# Patient Record
Sex: FEMALE | Race: WHITE | NOT HISPANIC OR LATINO | Employment: UNEMPLOYED | URBAN - NONMETROPOLITAN AREA
[De-identification: names, ages, dates, MRNs, and addresses within clinical notes are randomized per-mention and may not be internally consistent; named-entity substitution may affect disease eponyms.]

---

## 2017-08-21 ENCOUNTER — HOSPITAL ENCOUNTER (EMERGENCY)
Facility: HOSPITAL | Age: 27
Discharge: HOME | End: 2017-08-21
Attending: EMERGENCY MEDICINE | Admitting: EMERGENCY MEDICINE

## 2017-08-21 ENCOUNTER — APPOINTMENT (OUTPATIENT)
Dept: GENERAL RADIOLOGY | Facility: HOSPITAL | Age: 27
End: 2017-08-21

## 2017-08-21 VITALS
SYSTOLIC BLOOD PRESSURE: 115 MMHG | HEART RATE: 98 BPM | HEIGHT: 66 IN | OXYGEN SATURATION: 98 % | BODY MASS INDEX: 34.55 KG/M2 | DIASTOLIC BLOOD PRESSURE: 78 MMHG | WEIGHT: 215 LBS | TEMPERATURE: 98.7 F | RESPIRATION RATE: 18 BRPM

## 2017-08-21 DIAGNOSIS — L02.511 ABSCESS OF RIGHT HAND: Primary | ICD-10-CM

## 2017-08-21 DIAGNOSIS — L03.113 CELLULITIS OF RIGHT HAND: ICD-10-CM

## 2017-08-21 DIAGNOSIS — L02.611 ABSCESS OF RIGHT FOOT: ICD-10-CM

## 2017-08-21 DIAGNOSIS — L03.115 CELLULITIS OF RIGHT ANKLE: ICD-10-CM

## 2017-08-21 LAB
6-ACETYL MORPHINE: NEGATIVE
ALBUMIN SERPL-MCNC: 3.8 G/DL (ref 3.5–5)
ALBUMIN/GLOB SERPL: 1.3 G/DL (ref 1.5–2.5)
ALP SERPL-CCNC: 89 U/L (ref 35–104)
ALT SERPL W P-5'-P-CCNC: 22 U/L (ref 10–36)
AMPHET+METHAMPHET UR QL: NEGATIVE
ANION GAP SERPL CALCULATED.3IONS-SCNC: 5.1 MMOL/L (ref 3.6–11.2)
AST SERPL-CCNC: 18 U/L (ref 10–30)
B-HCG UR QL: NEGATIVE
BARBITURATES UR QL SCN: POSITIVE
BASOPHILS # BLD AUTO: 0.02 10*3/MM3 (ref 0–0.3)
BASOPHILS NFR BLD AUTO: 0.2 % (ref 0–2)
BENZODIAZ UR QL SCN: NEGATIVE
BILIRUB SERPL-MCNC: 0.3 MG/DL (ref 0.2–1.8)
BILIRUB UR QL STRIP: NEGATIVE
BUN BLD-MCNC: 6 MG/DL (ref 7–21)
BUN/CREAT SERPL: 15 (ref 7–25)
BUPRENORPHINE SERPL-MCNC: POSITIVE NG/ML
CALCIUM SPEC-SCNC: 8.8 MG/DL (ref 7.7–10)
CANNABINOIDS SERPL QL: POSITIVE
CHLORIDE SERPL-SCNC: 105 MMOL/L (ref 99–112)
CLARITY UR: ABNORMAL
CO2 SERPL-SCNC: 24.9 MMOL/L (ref 24.3–31.9)
COCAINE UR QL: NEGATIVE
COLOR UR: YELLOW
CREAT BLD-MCNC: 0.4 MG/DL (ref 0.43–1.29)
CRP SERPL-MCNC: 18.32 MG/DL (ref 0–0.99)
D-LACTATE SERPL-SCNC: 0.5 MMOL/L (ref 0.5–2)
DEPRECATED RDW RBC AUTO: 43.8 FL (ref 37–54)
EOSINOPHIL # BLD AUTO: 0.13 10*3/MM3 (ref 0–0.7)
EOSINOPHIL NFR BLD AUTO: 1.3 % (ref 0–5)
ERYTHROCYTE [DISTWIDTH] IN BLOOD BY AUTOMATED COUNT: 14.2 % (ref 11.5–14.5)
ERYTHROCYTE [SEDIMENTATION RATE] IN BLOOD: 25 MM/HR (ref 0–20)
GFR SERPL CREATININE-BSD FRML MDRD: >150 ML/MIN/1.73
GLOBULIN UR ELPH-MCNC: 2.9 GM/DL
GLUCOSE BLD-MCNC: 81 MG/DL (ref 70–110)
GLUCOSE UR STRIP-MCNC: NEGATIVE MG/DL
HCT VFR BLD AUTO: 39 % (ref 37–47)
HGB BLD-MCNC: 13.1 G/DL (ref 12–16)
HGB UR QL STRIP.AUTO: NEGATIVE
IMM GRANULOCYTES # BLD: 0.02 10*3/MM3 (ref 0–0.03)
IMM GRANULOCYTES NFR BLD: 0.2 % (ref 0–0.5)
KETONES UR QL STRIP: NEGATIVE
LEUKOCYTE ESTERASE UR QL STRIP.AUTO: NEGATIVE
LYMPHOCYTES # BLD AUTO: 2.02 10*3/MM3 (ref 1–3)
LYMPHOCYTES NFR BLD AUTO: 20.4 % (ref 21–51)
MCH RBC QN AUTO: 28.9 PG (ref 27–33)
MCHC RBC AUTO-ENTMCNC: 33.6 G/DL (ref 33–37)
MCV RBC AUTO: 85.9 FL (ref 80–94)
METHADONE UR QL SCN: NEGATIVE
MONOCYTES # BLD AUTO: 1.02 10*3/MM3 (ref 0.1–0.9)
MONOCYTES NFR BLD AUTO: 10.3 % (ref 0–10)
NEUTROPHILS # BLD AUTO: 6.69 10*3/MM3 (ref 1.4–6.5)
NEUTROPHILS NFR BLD AUTO: 67.6 % (ref 30–70)
NITRITE UR QL STRIP: NEGATIVE
OPIATES UR QL: NEGATIVE
OSMOLALITY SERPL CALC.SUM OF ELEC: 266.7 MOSM/KG (ref 273–305)
OXYCODONE UR QL SCN: NEGATIVE
PCP UR QL SCN: NEGATIVE
PH UR STRIP.AUTO: 6.5 [PH] (ref 5–8)
PLATELET # BLD AUTO: 228 10*3/MM3 (ref 130–400)
PMV BLD AUTO: 11.6 FL (ref 6–10)
POTASSIUM BLD-SCNC: 3.3 MMOL/L (ref 3.5–5.3)
PROT SERPL-MCNC: 6.7 G/DL (ref 6–8)
PROT UR QL STRIP: NEGATIVE
RBC # BLD AUTO: 4.54 10*6/MM3 (ref 4.2–5.4)
SODIUM BLD-SCNC: 135 MMOL/L (ref 135–153)
SP GR UR STRIP: 1.01 (ref 1–1.03)
UROBILINOGEN UR QL STRIP: ABNORMAL
WBC NRBC COR # BLD: 9.9 10*3/MM3 (ref 4.5–12.5)

## 2017-08-21 PROCEDURE — 81003 URINALYSIS AUTO W/O SCOPE: CPT | Performed by: PHYSICIAN ASSISTANT

## 2017-08-21 PROCEDURE — 87040 BLOOD CULTURE FOR BACTERIA: CPT | Performed by: PHYSICIAN ASSISTANT

## 2017-08-21 PROCEDURE — 80307 DRUG TEST PRSMV CHEM ANLYZR: CPT | Performed by: PHYSICIAN ASSISTANT

## 2017-08-21 PROCEDURE — 73610 X-RAY EXAM OF ANKLE: CPT

## 2017-08-21 PROCEDURE — 73130 X-RAY EXAM OF HAND: CPT | Performed by: RADIOLOGY

## 2017-08-21 PROCEDURE — 80053 COMPREHEN METABOLIC PANEL: CPT | Performed by: PHYSICIAN ASSISTANT

## 2017-08-21 PROCEDURE — 96361 HYDRATE IV INFUSION ADD-ON: CPT

## 2017-08-21 PROCEDURE — 73130 X-RAY EXAM OF HAND: CPT

## 2017-08-21 PROCEDURE — 73610 X-RAY EXAM OF ANKLE: CPT | Performed by: RADIOLOGY

## 2017-08-21 PROCEDURE — 99283 EMERGENCY DEPT VISIT LOW MDM: CPT

## 2017-08-21 PROCEDURE — 85025 COMPLETE CBC W/AUTO DIFF WBC: CPT | Performed by: PHYSICIAN ASSISTANT

## 2017-08-21 PROCEDURE — 85652 RBC SED RATE AUTOMATED: CPT | Performed by: PHYSICIAN ASSISTANT

## 2017-08-21 PROCEDURE — 83605 ASSAY OF LACTIC ACID: CPT | Performed by: PHYSICIAN ASSISTANT

## 2017-08-21 PROCEDURE — 25010000002 DALBAVANCIN 500 MG RECONSTITUTED SOLUTION 1 EACH VIAL: Performed by: PHYSICIAN ASSISTANT

## 2017-08-21 PROCEDURE — 86140 C-REACTIVE PROTEIN: CPT | Performed by: PHYSICIAN ASSISTANT

## 2017-08-21 PROCEDURE — 81025 URINE PREGNANCY TEST: CPT | Performed by: PHYSICIAN ASSISTANT

## 2017-08-21 PROCEDURE — 96365 THER/PROPH/DIAG IV INF INIT: CPT

## 2017-08-21 RX ORDER — LIDOCAINE HYDROCHLORIDE 10 MG/ML
10 INJECTION, SOLUTION EPIDURAL; INFILTRATION; INTRACAUDAL; PERINEURAL ONCE
Status: COMPLETED | OUTPATIENT
Start: 2017-08-21 | End: 2017-08-21

## 2017-08-21 RX ORDER — SODIUM CHLORIDE 0.9 % (FLUSH) 0.9 %
10 SYRINGE (ML) INJECTION AS NEEDED
Status: DISCONTINUED | OUTPATIENT
Start: 2017-08-21 | End: 2017-08-22 | Stop reason: HOSPADM

## 2017-08-21 RX ORDER — BUPRENORPHINE HYDROCHLORIDE AND NALOXONE HYDROCHLORIDE DIHYDRATE 8; 2 MG/1; MG/1
1 TABLET SUBLINGUAL DAILY
Status: ON HOLD | COMMUNITY
End: 2017-11-06

## 2017-08-21 RX ADMIN — SODIUM CHLORIDE 1000 ML: 9 INJECTION, SOLUTION INTRAVENOUS at 20:38

## 2017-08-21 RX ADMIN — LIDOCAINE HYDROCHLORIDE 10 ML: 10 INJECTION, SOLUTION EPIDURAL; INFILTRATION; INTRACAUDAL; PERINEURAL at 21:27

## 2017-08-21 RX ADMIN — DALBAVANCIN 1500 MG: 500 INJECTION, POWDER, FOR SOLUTION INTRAVENOUS at 22:22

## 2017-08-22 NOTE — ED PROVIDER NOTES
Subjective   HPI Comments: 27 year old female who presents to the ED with an abscess to her right hand and right ankle.  She states she noticed them yesterday.  She states the areas have been draining.  She states she has had a fever of 101.  She took Ibuprofen at 11 am today and Tylenol at 4 pm today.  She denies any nausea or vomiting.  She states she did take 2 leftover Amoxicillin yesterday.  She denies any IV drug use.  She states she thinks this all started due to an abscess tooth that started 3 days ago.    Patient is a 27 y.o. female presenting with abscess.   History provided by:  Patient  Abscess   Location:  Hand and leg  Hand abscess location:  Dorsum of R hand  Leg abscess location:  R ankle  Size:  2 cm on hand, 3 cm on ankle  Abscess quality: draining, fluctuance, painful, redness and warmth    Duration:  1 day  Progression:  Worsening  Pain details:     Quality:  Throbbing    Severity:  Moderate    Timing:  Constant    Progression:  Worsening  Chronicity:  New  Context: not injected drug use    Relieved by:  Nothing  Worsened by:  Nothing  Associated symptoms: fever    Associated symptoms: no nausea and no vomiting        Review of Systems   Constitutional: Positive for chills and fever.   HENT: Negative.    Eyes: Negative.    Respiratory: Negative.    Cardiovascular: Negative.    Gastrointestinal: Negative for nausea and vomiting.   Genitourinary: Negative.    Musculoskeletal: Negative.    Skin: Positive for wound.   Neurological: Negative.    Psychiatric/Behavioral: Negative.    All other systems reviewed and are negative.      Past Medical History:   Diagnosis Date   • Bipolar disorder    • Depression    • History of nightmares    • Liver disease     Unknown B or C    • Meningitis spinal     Age 12   • Panic disorder    • PTSD (post-traumatic stress disorder)     Visual Disturbance    • Substance abuse     Drug       No Known Allergies    Past Surgical History:   Procedure Laterality Date   • FOOT  SURGERY Left        Family History   Problem Relation Age of Onset   • Depression Mother    • ADD / ADHD Sister    • Alcohol abuse Sister    • Depression Sister    • Drug abuse Sister    • Asperger's syndrome Brother    • No Known Problems Maternal Aunt    • No Known Problems Paternal Aunt    • No Known Problems Maternal Uncle    • No Known Problems Paternal Uncle    • No Known Problems Maternal Grandfather    • No Known Problems Maternal Grandmother    • No Known Problems Paternal Grandfather    • No Known Problems Paternal Grandmother    • No Known Problems Cousin    • Depression Other        Social History     Social History   • Marital status: Single     Spouse name: N/A   • Number of children: N/A   • Years of education: N/A     Social History Main Topics   • Smoking status: Current Every Day Smoker     Packs/day: 1.00     Years: 10.00   • Smokeless tobacco: Never Used      Comment: Pt. declines counseling at this time.    • Alcohol use No   • Drug use: Yes     Special: Other, Marijuana      Comment: Suboxone, Opiate   • Sexual activity: Defer     Other Topics Concern   • None     Social History Narrative           Objective   Physical Exam   Constitutional: She is oriented to person, place, and time. She appears well-developed and well-nourished. No distress.   HENT:   Head: Normocephalic and atraumatic.   Right Ear: External ear normal.   Left Ear: External ear normal.   Nose: Nose normal.   Mouth/Throat: Oropharynx is clear and moist.   Pt has a fractured right lower second molar, no gum swelling seen   Eyes: Conjunctivae and EOM are normal. Pupils are equal, round, and reactive to light.   Neck: Normal range of motion. Neck supple.   Cardiovascular: Normal rate, regular rhythm, normal heart sounds and intact distal pulses.    Pulmonary/Chest: Effort normal and breath sounds normal. No respiratory distress.   Abdominal: Soft. Bowel sounds are normal. There is no tenderness.   Musculoskeletal: Normal range of  motion.   Neurological: She is alert and oriented to person, place, and time.   Skin: Skin is warm and dry. There is erythema.   Patient has a 2 cm abscess over the dorsum of the right 1st metacarpal with redness and swelling over the entire dorsum of the right hand extending into the right wrist.  She has full ROM of her wrist and thumb.  Mild drainage seen.  Patient has a 3 cm abscess on the medial side of the right ankle with redness and swelling that extends into the right foot and a third of the way up the right anterior lower leg.  Mild drainage seen.   Psychiatric: She has a normal mood and affect. Her behavior is normal. Judgment and thought content normal.   Nursing note and vitals reviewed.      Incision and drainage  Date/Time: 8/21/2017 9:39 PM  Performed by: GIANNI MARCIAL  Authorized by: DAWN BULL     Consent:     Consent obtained:  Written    Consent given by:  Patient    Risks discussed:  Bleeding, incomplete drainage, pain and infection  Location:     Type:  Abscess    Size:  3 cm    Location:  Lower extremity    Lower extremity location:  R ankle  Pre-procedure details:     Skin preparation:  Chloraprep  Anesthesia (see MAR for exact dosages):     Anesthesia method:  Local infiltration    Local anesthetic:  Lidocaine 1% w/o epi  Procedure details:     Complexity:  Simple    Incision types:  Single straight    Incision depth:  Dermal    Scalpel blade:  11    Wound management:  Probed and deloculated    Drainage:  Purulent    Drainage amount:  Moderate    Wound treatment:  Wound left open  Post-procedure details:     Patient tolerance of procedure:  Tolerated well, no immediate complications  Incision and drainage  Date/Time: 8/21/2017 9:40 PM  Performed by: GIANNI MARCIAL  Authorized by: DAWN BULL     Consent:     Consent obtained:  Written    Consent given by:  Patient    Risks discussed:  Bleeding, incomplete drainage, pain and damage to other organs  Location:     Type:  Abscess     Size:  2 cm    Location:  Upper extremity    Upper extremity location:  R hand  Pre-procedure details:     Skin preparation:  Chloraprep  Anesthesia (see MAR for exact dosages):     Anesthesia method:  Local infiltration    Local anesthetic:  Lidocaine 1% w/o epi  Procedure details:     Complexity:  Simple    Incision types:  Single straight    Incision depth:  Dermal    Scalpel blade:  11    Wound management:  Probed and deloculated    Drainage:  Purulent    Drainage amount:  Moderate    Wound treatment:  Wound left open  Post-procedure details:     Patient tolerance of procedure:  Tolerated well, no immediate complications             ED Course  ED Course   Comment By Time   Dr. Ferro reviewed x-rays, no acute findings.  I have performed an I&D on both abscesses, patient tolerated well.  Will give a dose of Dalvance and then will discharge home to follow up outpatient. CULLEN Meyers 08/21 2141                  [facility]  Number of Diagnoses or Management Options  Abscess of right foot:   Abscess of right hand:   Cellulitis of right ankle:   Cellulitis of right hand:      Amount and/or Complexity of Data Reviewed  Clinical lab tests: reviewed  Tests in the radiology section of CPT®: reviewed    Patient Progress  Patient progress: stable      Final diagnoses:   Abscess of right hand   Abscess of right foot   Cellulitis of right hand   Cellulitis of right ankle            CULLEN Meyers  08/21/17 [id number]

## 2017-08-22 NOTE — ED NOTES
Pt in the middle of an I&D procedure. Will obtain 2nd set of blood cultures when procedure is done.      Amaya Sainz  08/21/17 2534

## 2017-08-22 NOTE — DISCHARGE INSTRUCTIONS

## 2017-08-22 NOTE — ED NOTES
Unable to obtain blood for blood culture via 2 failed attempts. Called lab to obtain blood. Spoke with Radha in lab who said she would come and try to get the blood cultures.     Amaya Sainz  08/21/17 [id number]

## 2017-08-26 LAB — BACTERIA SPEC AEROBE CULT: NORMAL

## 2017-08-27 LAB — BACTERIA SPEC AEROBE CULT: NORMAL

## 2017-11-06 ENCOUNTER — HOSPITAL ENCOUNTER (EMERGENCY)
Facility: HOSPITAL | Age: 27
Discharge: STILL ADMITTED OR ADMINISTRATIVE | End: 2017-11-06
Attending: EMERGENCY MEDICINE

## 2017-11-06 ENCOUNTER — HOSPITAL ENCOUNTER (INPATIENT)
Facility: HOSPITAL | Age: 27
LOS: 4 days | Discharge: HOME | End: 2017-11-10
Attending: PSYCHIATRY & NEUROLOGY | Admitting: PSYCHIATRY & NEUROLOGY

## 2017-11-06 VITALS
SYSTOLIC BLOOD PRESSURE: 117 MMHG | HEIGHT: 67 IN | TEMPERATURE: 97.7 F | WEIGHT: 280 LBS | BODY MASS INDEX: 43.95 KG/M2 | DIASTOLIC BLOOD PRESSURE: 84 MMHG | HEART RATE: 86 BPM | RESPIRATION RATE: 16 BRPM | OXYGEN SATURATION: 98 %

## 2017-11-06 DIAGNOSIS — F19.10 SUBSTANCE ABUSE (HCC): ICD-10-CM

## 2017-11-06 DIAGNOSIS — F29 PSYCHOSIS, UNSPECIFIED PSYCHOSIS TYPE (HCC): Primary | ICD-10-CM

## 2017-11-06 DIAGNOSIS — F31.64 BIPOLAR DISORDER, CURRENT EPISODE MIXED, SEVERE, WITH PSYCHOTIC FEATURES (HCC): ICD-10-CM

## 2017-11-06 PROBLEM — R45.851 SUICIDAL IDEATIONS: Status: ACTIVE | Noted: 2017-11-06

## 2017-11-06 LAB
6-ACETYL MORPHINE: NEGATIVE
ALBUMIN SERPL-MCNC: 4.5 G/DL (ref 3.5–5)
ALBUMIN/GLOB SERPL: 1.3 G/DL (ref 1.5–2.5)
ALP SERPL-CCNC: 103 U/L (ref 35–104)
ALT SERPL W P-5'-P-CCNC: 29 U/L (ref 10–36)
AMPHET+METHAMPHET UR QL: POSITIVE
ANION GAP SERPL CALCULATED.3IONS-SCNC: 6.2 MMOL/L (ref 3.6–11.2)
AST SERPL-CCNC: 19 U/L (ref 10–30)
B-HCG UR QL: NEGATIVE
BARBITURATES UR QL SCN: NEGATIVE
BASOPHILS # BLD AUTO: 0.04 10*3/MM3 (ref 0–0.3)
BASOPHILS NFR BLD AUTO: 0.5 % (ref 0–2)
BENZODIAZ UR QL SCN: NEGATIVE
BILIRUB SERPL-MCNC: 0.6 MG/DL (ref 0.2–1.8)
BILIRUB UR QL STRIP: NEGATIVE
BUN BLD-MCNC: <5 MG/DL (ref 7–21)
BUN/CREAT SERPL: ABNORMAL (ref 7–25)
BUPRENORPHINE SERPL-MCNC: POSITIVE NG/ML
CALCIUM SPEC-SCNC: 9.9 MG/DL (ref 7.7–10)
CANNABINOIDS SERPL QL: POSITIVE
CHLORIDE SERPL-SCNC: 107 MMOL/L (ref 99–112)
CLARITY UR: ABNORMAL
CO2 SERPL-SCNC: 24.8 MMOL/L (ref 24.3–31.9)
COCAINE UR QL: NEGATIVE
COLOR UR: YELLOW
CREAT BLD-MCNC: 0.52 MG/DL (ref 0.43–1.29)
DEPRECATED RDW RBC AUTO: 44.9 FL (ref 37–54)
EOSINOPHIL # BLD AUTO: 0.3 10*3/MM3 (ref 0–0.7)
EOSINOPHIL NFR BLD AUTO: 4 % (ref 0–5)
ERYTHROCYTE [DISTWIDTH] IN BLOOD BY AUTOMATED COUNT: 14.1 % (ref 11.5–14.5)
ETHANOL BLD-MCNC: <10 MG/DL
ETHANOL UR QL: <0.01 %
GFR SERPL CREATININE-BSD FRML MDRD: 139 ML/MIN/1.73
GLOBULIN UR ELPH-MCNC: 3.4 GM/DL
GLUCOSE BLD-MCNC: 104 MG/DL (ref 70–110)
GLUCOSE UR STRIP-MCNC: NEGATIVE MG/DL
HCT VFR BLD AUTO: 46.1 % (ref 37–47)
HGB BLD-MCNC: 15.3 G/DL (ref 12–16)
HGB UR QL STRIP.AUTO: NEGATIVE
IMM GRANULOCYTES # BLD: 0 10*3/MM3 (ref 0–0.03)
IMM GRANULOCYTES NFR BLD: 0 % (ref 0–0.5)
KETONES UR QL STRIP: ABNORMAL
LEUKOCYTE ESTERASE UR QL STRIP.AUTO: NEGATIVE
LYMPHOCYTES # BLD AUTO: 1.97 10*3/MM3 (ref 1–3)
LYMPHOCYTES NFR BLD AUTO: 26.4 % (ref 21–51)
MAGNESIUM SERPL-MCNC: 2.3 MG/DL (ref 1.7–2.6)
MCH RBC QN AUTO: 28.9 PG (ref 27–33)
MCHC RBC AUTO-ENTMCNC: 33.2 G/DL (ref 33–37)
MCV RBC AUTO: 87.1 FL (ref 80–94)
METHADONE UR QL SCN: NEGATIVE
MONOCYTES # BLD AUTO: 0.67 10*3/MM3 (ref 0.1–0.9)
MONOCYTES NFR BLD AUTO: 9 % (ref 0–10)
NEUTROPHILS # BLD AUTO: 4.47 10*3/MM3 (ref 1.4–6.5)
NEUTROPHILS NFR BLD AUTO: 60.1 % (ref 30–70)
NITRITE UR QL STRIP: NEGATIVE
OPIATES UR QL: NEGATIVE
OSMOLALITY SERPL CALC.SUM OF ELEC: NORMAL MOSM/KG (ref 273–305)
OXYCODONE UR QL SCN: NEGATIVE
PCP UR QL SCN: NEGATIVE
PH UR STRIP.AUTO: 7.5 [PH] (ref 5–8)
PLATELET # BLD AUTO: 236 10*3/MM3 (ref 130–400)
PMV BLD AUTO: 11.6 FL (ref 6–10)
POTASSIUM BLD-SCNC: 4 MMOL/L (ref 3.5–5.3)
PROT SERPL-MCNC: 7.9 G/DL (ref 6–8)
PROT UR QL STRIP: NEGATIVE
RBC # BLD AUTO: 5.29 10*6/MM3 (ref 4.2–5.4)
SODIUM BLD-SCNC: 138 MMOL/L (ref 135–153)
SP GR UR STRIP: 1.02 (ref 1–1.03)
UROBILINOGEN UR QL STRIP: ABNORMAL
WBC NRBC COR # BLD: 7.45 10*3/MM3 (ref 4.5–12.5)

## 2017-11-06 PROCEDURE — 80307 DRUG TEST PRSMV CHEM ANLYZR: CPT | Performed by: EMERGENCY MEDICINE

## 2017-11-06 PROCEDURE — 80053 COMPREHEN METABOLIC PANEL: CPT | Performed by: EMERGENCY MEDICINE

## 2017-11-06 PROCEDURE — 85025 COMPLETE CBC W/AUTO DIFF WBC: CPT | Performed by: EMERGENCY MEDICINE

## 2017-11-06 PROCEDURE — 81025 URINE PREGNANCY TEST: CPT | Performed by: EMERGENCY MEDICINE

## 2017-11-06 PROCEDURE — 83735 ASSAY OF MAGNESIUM: CPT | Performed by: EMERGENCY MEDICINE

## 2017-11-06 PROCEDURE — 81003 URINALYSIS AUTO W/O SCOPE: CPT | Performed by: EMERGENCY MEDICINE

## 2017-11-06 PROCEDURE — 93005 ELECTROCARDIOGRAM TRACING: CPT | Performed by: PSYCHIATRY & NEUROLOGY

## 2017-11-06 RX ORDER — LOPERAMIDE HYDROCHLORIDE 2 MG/1
2 CAPSULE ORAL 4 TIMES DAILY PRN
Status: DISCONTINUED | OUTPATIENT
Start: 2017-11-06 | End: 2017-11-10 | Stop reason: HOSPADM

## 2017-11-06 RX ORDER — CLONIDINE HYDROCHLORIDE 0.1 MG/1
0.1 TABLET ORAL ONCE AS NEEDED
Status: ACTIVE | OUTPATIENT
Start: 2017-11-09 | End: 2017-11-10

## 2017-11-06 RX ORDER — HYDROXYZINE 50 MG/1
50 TABLET, FILM COATED ORAL 3 TIMES DAILY PRN
Status: DISCONTINUED | OUTPATIENT
Start: 2017-11-06 | End: 2017-11-10 | Stop reason: HOSPADM

## 2017-11-06 RX ORDER — PRAZOSIN HYDROCHLORIDE 1 MG/1
2 CAPSULE ORAL NIGHTLY
Status: DISCONTINUED | OUTPATIENT
Start: 2017-11-06 | End: 2017-11-08

## 2017-11-06 RX ORDER — ESCITALOPRAM OXALATE 10 MG/1
20 TABLET ORAL DAILY
Status: DISCONTINUED | OUTPATIENT
Start: 2017-11-07 | End: 2017-11-10 | Stop reason: HOSPADM

## 2017-11-06 RX ORDER — ONDANSETRON 4 MG/1
4 TABLET, FILM COATED ORAL EVERY 6 HOURS PRN
Status: DISCONTINUED | OUTPATIENT
Start: 2017-11-06 | End: 2017-11-10 | Stop reason: HOSPADM

## 2017-11-06 RX ORDER — CLONIDINE HYDROCHLORIDE 0.1 MG/1
0.1 TABLET ORAL 3 TIMES DAILY PRN
Status: ACTIVE | OUTPATIENT
Start: 2017-11-07 | End: 2017-11-08

## 2017-11-06 RX ORDER — CLONIDINE HYDROCHLORIDE 0.1 MG/1
0.1 TABLET ORAL EVERY MORNING
COMMUNITY
End: 2017-11-10 | Stop reason: HOSPADM

## 2017-11-06 RX ORDER — ALUMINA, MAGNESIA, AND SIMETHICONE 2400; 2400; 240 MG/30ML; MG/30ML; MG/30ML
15 SUSPENSION ORAL EVERY 6 HOURS PRN
Status: DISCONTINUED | OUTPATIENT
Start: 2017-11-06 | End: 2017-11-10 | Stop reason: HOSPADM

## 2017-11-06 RX ORDER — FAMOTIDINE 20 MG/1
20 TABLET, FILM COATED ORAL 2 TIMES DAILY PRN
Status: DISCONTINUED | OUTPATIENT
Start: 2017-11-06 | End: 2017-11-10 | Stop reason: HOSPADM

## 2017-11-06 RX ORDER — HYDROXYZINE HYDROCHLORIDE 25 MG/1
50 TABLET, FILM COATED ORAL ONCE
Status: DISCONTINUED | OUTPATIENT
Start: 2017-11-06 | End: 2017-11-06 | Stop reason: HOSPADM

## 2017-11-06 RX ORDER — CLONIDINE HYDROCHLORIDE 0.1 MG/1
0.1 TABLET ORAL 2 TIMES DAILY PRN
Status: ACTIVE | OUTPATIENT
Start: 2017-11-08 | End: 2017-11-09

## 2017-11-06 RX ORDER — ECHINACEA PURPUREA EXTRACT 125 MG
2 TABLET ORAL AS NEEDED
Status: DISCONTINUED | OUTPATIENT
Start: 2017-11-06 | End: 2017-11-10 | Stop reason: HOSPADM

## 2017-11-06 RX ORDER — BENZONATATE 100 MG/1
100 CAPSULE ORAL 3 TIMES DAILY PRN
Status: DISCONTINUED | OUTPATIENT
Start: 2017-11-06 | End: 2017-11-10 | Stop reason: HOSPADM

## 2017-11-06 RX ORDER — CLONIDINE HYDROCHLORIDE 0.1 MG/1
0.1 TABLET ORAL EVERY MORNING
Status: DISCONTINUED | OUTPATIENT
Start: 2017-11-07 | End: 2017-11-09

## 2017-11-06 RX ORDER — DICYCLOMINE HYDROCHLORIDE 10 MG/1
10 CAPSULE ORAL 3 TIMES DAILY PRN
Status: DISCONTINUED | OUTPATIENT
Start: 2017-11-06 | End: 2017-11-10 | Stop reason: HOSPADM

## 2017-11-06 RX ORDER — CLONIDINE HYDROCHLORIDE 0.1 MG/1
0.1 TABLET ORAL 4 TIMES DAILY PRN
Status: ACTIVE | OUTPATIENT
Start: 2017-11-06 | End: 2017-11-07

## 2017-11-06 RX ORDER — PREGABALIN 75 MG/1
300 CAPSULE ORAL 2 TIMES DAILY
Status: DISCONTINUED | OUTPATIENT
Start: 2017-11-06 | End: 2017-11-07

## 2017-11-06 RX ORDER — CYCLOBENZAPRINE HCL 10 MG
10 TABLET ORAL 3 TIMES DAILY PRN
Status: DISCONTINUED | OUTPATIENT
Start: 2017-11-06 | End: 2017-11-10 | Stop reason: HOSPADM

## 2017-11-06 RX ORDER — IBUPROFEN 600 MG/1
600 TABLET ORAL EVERY 6 HOURS PRN
Status: DISCONTINUED | OUTPATIENT
Start: 2017-11-06 | End: 2017-11-10 | Stop reason: HOSPADM

## 2017-11-06 RX ORDER — TRAZODONE HYDROCHLORIDE 50 MG/1
50 TABLET ORAL NIGHTLY PRN
Status: DISCONTINUED | OUTPATIENT
Start: 2017-11-06 | End: 2017-11-08

## 2017-11-06 RX ORDER — NICOTINE 21 MG/24HR
1 PATCH, TRANSDERMAL 24 HOURS TRANSDERMAL DAILY
Status: DISCONTINUED | OUTPATIENT
Start: 2017-11-06 | End: 2017-11-10 | Stop reason: HOSPADM

## 2017-11-06 RX ORDER — ONDANSETRON 4 MG/1
4 TABLET, FILM COATED ORAL 3 TIMES DAILY PRN
Status: DISCONTINUED | OUTPATIENT
Start: 2017-11-06 | End: 2017-11-10 | Stop reason: HOSPADM

## 2017-11-06 RX ADMIN — PRAZOSIN HYDROCHLORIDE 2 MG: 1 CAPSULE ORAL at 22:19

## 2017-11-06 RX ADMIN — PREGABALIN 300 MG: 75 CAPSULE ORAL at 22:19

## 2017-11-06 NOTE — ED PROVIDER NOTES
Subjective   Patient is a 27 y.o. female presenting with mental health disorder.   Mental Health Problem   Presenting symptoms: agitation, delusional, depression, hallucinations, paranoid behavior and suicidal thoughts    Degree of incapacity (severity):  Moderate  Onset quality:  Gradual  Duration:  4 months  Timing:  Constant  Chronicity:  New  Context: not alcohol use and not drug abuse    Treatment compliance:  Untreated  Relieved by:  Nothing  Associated symptoms: feelings of worthlessness    Associated symptoms: no abdominal pain and no chest pain        Review of Systems   Constitutional: Negative.  Negative for fever.   HENT: Negative.    Respiratory: Negative.    Cardiovascular: Negative.  Negative for chest pain.   Gastrointestinal: Negative.  Negative for abdominal pain.   Endocrine: Negative.    Genitourinary: Negative.  Negative for dysuria.   Skin: Negative.    Neurological: Negative.    Psychiatric/Behavioral: Positive for agitation, hallucinations, paranoia and suicidal ideas.   All other systems reviewed and are negative.      Past Medical History:   Diagnosis Date   • Bipolar disorder    • Depression    • History of nightmares    • Liver disease     Unknown B or C    • Meningitis spinal     Age 12   • Panic disorder    • PTSD (post-traumatic stress disorder)     Visual Disturbance    • Substance abuse     Drug       No Known Allergies    Past Surgical History:   Procedure Laterality Date   • FOOT SURGERY Left        Family History   Problem Relation Age of Onset   • Depression Mother    • ADD / ADHD Sister    • Alcohol abuse Sister    • Depression Sister    • Drug abuse Sister    • Asperger's syndrome Brother    • No Known Problems Maternal Aunt    • No Known Problems Paternal Aunt    • No Known Problems Maternal Uncle    • No Known Problems Paternal Uncle    • No Known Problems Maternal Grandfather    • No Known Problems Maternal Grandmother    • No Known Problems Paternal Grandfather    • No  Known Problems Paternal Grandmother    • No Known Problems Cousin    • Depression Other        Social History     Social History   • Marital status: Single     Spouse name: N/A   • Number of children: N/A   • Years of education: N/A     Social History Main Topics   • Smoking status: Current Every Day Smoker     Packs/day: 1.00     Years: 10.00   • Smokeless tobacco: Never Used      Comment: Pt. declines counseling at this time.    • Alcohol use No   • Drug use: Yes     Special: Other, Marijuana      Comment: Suboxone, Opiate   • Sexual activity: Defer     Other Topics Concern   • Not on file     Social History Narrative           Objective   Physical Exam   Constitutional: She is oriented to person, place, and time. She appears well-developed and well-nourished. No distress.   Patient appears anxious.   HENT:   Head: Normocephalic and atraumatic.   Right Ear: External ear normal.   Left Ear: External ear normal.   Nose: Nose normal.   Eyes: Conjunctivae and EOM are normal. Pupils are equal, round, and reactive to light.   Neck: Normal range of motion. Neck supple. No JVD present. No tracheal deviation present.   Cardiovascular: Normal rate, regular rhythm and normal heart sounds.    No murmur heard.  Pulmonary/Chest: Effort normal and breath sounds normal. No respiratory distress. She has no wheezes.   Abdominal: Soft. Bowel sounds are normal. There is no tenderness.   Musculoskeletal: Normal range of motion. She exhibits no edema or deformity.   Neurological: She is alert and oriented to person, place, and time. No cranial nerve deficit.   Skin: Skin is warm and dry. No rash noted. She is not diaphoretic. No erythema. No pallor.   Psychiatric: She has a normal mood and affect. Her behavior is normal. Thought content normal.   Nursing note and vitals reviewed.      Procedures         ED Course  ED Course                  MDM  Number of Diagnoses or Management Options  new and requires workup  new and requires  workup  new and requires workup     Amount and/or Complexity of Data Reviewed  Clinical lab tests: ordered and reviewed  Tests in the radiology section of CPT®: ordered and reviewed  Discuss the patient with other providers: yes    Risk of Complications, Morbidity, and/or Mortality  Presenting problems: moderate        Final diagnoses:   Psychosis, unspecified psychosis type   Bipolar disorder, current episode mixed, severe, with psychotic features   Substance abuse            CULLEN Smart  11/06/17 2244

## 2017-11-06 NOTE — NURSING NOTE
Was talking with Dr. Squires, reviewing patient, lost service. Attempted to call home and cell phone numerous times with no answer.Will continue to try to reach.

## 2017-11-06 NOTE — NURSING NOTE
Pt searched by 2 staff members, changed into gowns. No contraband found. Personal items listed on belongings sheet. Pt placed in TX room awaiting assessment.

## 2017-11-06 NOTE — DISCHARGE INSTRUCTIONS

## 2017-11-06 NOTE — ED NOTES
Was unable to draw blood from pt. Chivo Latham(tech) and she is currently attempting.     Carlos Johnson  11/06/17 7626

## 2017-11-06 NOTE — NURSING NOTE
"Intake assessment completed. Pt is alert and oriented to place and self, she did no know the date. Pt here today with suicidal ideation. States, \"I just can't take it no more.\" When asked for a specific plan, pt states, \"Theres lots of fucking ways to kill myself.\" Reports auditory hallucinations for \"months.\" Reports the TV and radio are talking about her, but can't tell me what they are saying. States, \"I feel threatened by I don't know why.\" Pt reports having been in inpatient psych 2 times prior, here and at [facility]. Reports diagnosis of Bipolar DO, PTSD, and MDD. Pt is very defensive, refuses to answer much of the questions, goes from being tearful to angry quickly. When asked about stressors, states her family but can't give me any specific answers. Rates depression and anxiety at 10 on 1-10 scale. Denies HI. Reports not taking medications for about 2 months, sometimes she forgets, other times she hasn't had a way to get to the doctors office for refills. Intake process discussed with patient. Any questions answered. Pt waiting in TX room. SI precautions taken.   "

## 2017-11-06 NOTE — NURSING NOTE
"Lab work back shows pt + for Meth, Suboxone and THC. When first assessed, pt denied any drug use. Pt now reports she took \"a forth of a Suboxone strip and smoked a joint yesterday. She denies any withdrawal symptoms. COWS score - 10  "

## 2017-11-07 PROBLEM — F43.10 POST TRAUMATIC STRESS DISORDER (PTSD): Status: ACTIVE | Noted: 2017-11-07

## 2017-11-07 PROBLEM — B18.2 HEP C W/O COMA, CHRONIC (HCC): Status: ACTIVE | Noted: 2017-11-07

## 2017-11-07 PROBLEM — G89.21 CHRONIC PAIN DUE TO INJURY: Status: ACTIVE | Noted: 2017-11-07

## 2017-11-07 PROBLEM — F19.20 POLYSUBSTANCE (INCLUDING OPIOIDS) DEPENDENCE WITH PHYSIOLOGICAL DEPENDENCE (HCC): Status: ACTIVE | Noted: 2017-11-07

## 2017-11-07 PROCEDURE — 99223 1ST HOSP IP/OBS HIGH 75: CPT | Performed by: PSYCHIATRY & NEUROLOGY

## 2017-11-07 RX ORDER — NABUMETONE 500 MG/1
500 TABLET, FILM COATED ORAL 2 TIMES DAILY PRN
COMMUNITY

## 2017-11-07 RX ORDER — CYCLOBENZAPRINE HCL 10 MG
10 TABLET ORAL EVERY 8 HOURS PRN
COMMUNITY

## 2017-11-07 RX ORDER — IBUPROFEN 800 MG/1
800 TABLET ORAL EVERY 8 HOURS PRN
COMMUNITY

## 2017-11-07 RX ADMIN — PREGABALIN 300 MG: 75 CAPSULE ORAL at 08:32

## 2017-11-07 RX ADMIN — ESCITALOPRAM 20 MG: 10 TABLET, FILM COATED ORAL at 08:31

## 2017-11-07 RX ADMIN — CLONIDINE HYDROCHLORIDE 0.1 MG: 0.1 TABLET ORAL at 08:32

## 2017-11-07 RX ADMIN — NICOTINE 1 PATCH: 21 PATCH TRANSDERMAL at 08:31

## 2017-11-07 RX ADMIN — PRAZOSIN HYDROCHLORIDE 2 MG: 1 CAPSULE ORAL at 20:37

## 2017-11-07 RX ADMIN — HYDROXYZINE HYDROCHLORIDE 50 MG: 50 TABLET ORAL at 18:24

## 2017-11-07 RX ADMIN — HYDROXYZINE HYDROCHLORIDE 50 MG: 50 TABLET ORAL at 08:32
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
## 2017-11-07 NOTE — PLAN OF CARE
Problem: BH Patient Care Overview (Adult)  Goal: Plan of Care Review  Outcome: Ongoing (interventions implemented as appropriate)    11/07/17 9925   Coping/Psychosocial Response Interventions   Plan Of Care Reviewed With patient   Coping/Psychosocial   Patient Agreement with Plan of Care agrees   Patient Care Overview   Progress no change   Outcome Evaluation   Outcome Summary/Follow up Plan Patient isolates herself in her room.

## 2017-11-08 PROBLEM — F12.10 CANNABIS USE DISORDER, MILD, ABUSE: Status: ACTIVE | Noted: 2017-11-08

## 2017-11-08 PROBLEM — F15.10 METHAMPHETAMINE USE DISORDER, MILD, ABUSE (HCC): Status: ACTIVE | Noted: 2017-11-08

## 2017-11-08 PROBLEM — F11.23 OPIOID DEPENDENCE WITH WITHDRAWAL (HCC): Status: ACTIVE | Noted: 2017-11-07

## 2017-11-08 PROCEDURE — 99232 SBSQ HOSP IP/OBS MODERATE 35: CPT | Performed by: PSYCHIATRY & NEUROLOGY

## 2017-11-08 RX ORDER — QUETIAPINE FUMARATE 25 MG/1
25 TABLET, FILM COATED ORAL NIGHTLY
Status: DISCONTINUED | OUTPATIENT
Start: 2017-11-08 | End: 2017-11-09

## 2017-11-08 RX ORDER — PRAZOSIN HYDROCHLORIDE 1 MG/1
3 CAPSULE ORAL NIGHTLY
Status: DISCONTINUED | OUTPATIENT
Start: 2017-11-08 | End: 2017-11-10 | Stop reason: HOSPADM

## 2017-11-08 RX ORDER — MELOXICAM 7.5 MG/1
7.5 TABLET ORAL DAILY PRN
Status: DISCONTINUED | OUTPATIENT
Start: 2017-11-08 | End: 2017-11-09

## 2017-11-08 RX ADMIN — HYDROXYZINE HYDROCHLORIDE 50 MG: 50 TABLET ORAL at 20:21

## 2017-11-08 RX ADMIN — QUETIAPINE FUMARATE 25 MG: 25 TABLET, FILM COATED ORAL at 20:21

## 2017-11-08 RX ADMIN — IBUPROFEN 600 MG: 600 TABLET ORAL at 08:42

## 2017-11-08 RX ADMIN — IBUPROFEN 600 MG: 600 TABLET ORAL at 20:21

## 2017-11-08 RX ADMIN — NICOTINE 1 PATCH: 21 PATCH TRANSDERMAL at 08:40

## 2017-11-08 RX ADMIN — HYDROXYZINE HYDROCHLORIDE 50 MG: 50 TABLET ORAL at 08:42

## 2017-11-08 RX ADMIN — PRAZOSIN HYDROCHLORIDE 3 MG: 1 CAPSULE ORAL at 20:21

## 2017-11-08 RX ADMIN — CYCLOBENZAPRINE HYDROCHLORIDE 10 MG: 10 TABLET, FILM COATED ORAL at 20:21

## 2017-11-08 RX ADMIN — CLONIDINE HYDROCHLORIDE 0.1 MG: 0.1 TABLET ORAL at 08:40

## 2017-11-08 RX ADMIN — ESCITALOPRAM 20 MG: 10 TABLET, FILM COATED ORAL at 08:40

## 2017-11-08 RX ADMIN — CYCLOBENZAPRINE HYDROCHLORIDE 10 MG: 10 TABLET, FILM COATED ORAL at 08:42

## 2017-11-08 NOTE — PLAN OF CARE
Problem:  Patient Care Overview (Adult)  Goal: Discharge Needs Assessment  Outcome: Ongoing (interventions implemented as appropriate)    11/07/17 1448 11/08/17 1035   Discharge Needs Assessment   Concerns To Be Addressed --  coping/stress concerns;mental health concerns   Readmission Within The Last 30 Days no previous admission in last 30 days --    Community Agency Name(S) [facility] --    Current Discharge Risk psychiatric illness --    Discharge Planning Comments Patient has insurance and will need assistance with transportation upon stabilization. --    Discharge Needs Assessment   Outpatient/Agency/Support Group Needs outpatient counseling;outpatient medication management;outpatient psychiatric care (specify) --    Anticipated Discharge Disposition home with family --    Living Environment   Transportation Available none --       DATA: Met with patient this morning who was resting in bed.  She reported that she is having a great deal of pain in her back and is concerned that she may have bronchitis.  Encouraged patient to discuss this with the doctor today.  Informed patient that the  from [facility] will be coming to meet with her and she is agreeable.  Patient has been isolating in her room and reports that she has just not been feeling well.     ASSESSMENT:  Patient is denying suicidal ideation and denying homicidal ideation. She is reporting an acute increase in pain in her back and has concerns that she may have bronchitis. Patient has been isolating in her room and reports ongoing depression as well as anxiety.  She also endorses withdrawal symptoms and some ongoing cravings.     PLAN:  Patient will continue stabilization.  Patient is planned to meet with the [facility]  today.     stated that patient requests [facility] appointment.

## 2017-11-08 NOTE — PLAN OF CARE
Problem: BH Patient Care Overview (Adult)  Goal: Plan of Care Review  Outcome: Ongoing (interventions implemented as appropriate)  Pt isolates herself in her room most of the day. Reports back pain and reports that the doctor stopped her medication and reports that she needs it. Rates A/D 5/5. Denies SI/HI or hallucinations. Reports feeling helpless. Rates her craving a 6.     11/08/17 5116   Coping/Psychosocial Response Interventions   Plan Of Care Reviewed With patient   Coping/Psychosocial   Patient Agreement with Plan of Care agrees   Patient Care Overview   Progress no change       Goal: Interdisciplinary Rounds/Family Conference  Outcome: Ongoing (interventions implemented as appropriate)  Goal: Individualization and Mutuality  Outcome: Ongoing (interventions implemented as appropriate)  Goal: Discharge Needs Assessment  Outcome: Ongoing (interventions implemented as appropriate)    Problem:  Overarching Goals  Goal: Adheres to Safety Considerations for Self and Others  Outcome: Ongoing (interventions implemented as appropriate)  Goal: Optimized Coping Skills in Response to Life Stressors  Outcome: Ongoing (interventions implemented as appropriate)  Goal: Develops/Participates in Therapeutic [place] to Support Successful Transition  Outcome: Ongoing (interventions implemented as appropriate)

## 2017-11-08 NOTE — PROGRESS NOTES
"INPATIENT PSYCHIATRIC PROGRESS NOTE    Name:  Sary Mesa  :  1990  MRN:  [id number]  Visit Number:  [id number]  Length of stay:  2    SUBJECTIVE  CC: f/u depression    INTERVAL HISTORY:  Sary was seen for follow-up for depression.  She was hyper focused on Lyrica and why this was stopped.  I discussed that she had multiple other illicit substances in her system and that it was a contraindication due to her substance use.  The patient remained upset and tried bargaining for the medication.  She said \"I'm just gonna go out of here and shoot up the biggest fucking thing of meth and hope my heart stops.\"  She was not interested in other treatment alternatives.  She denied suicidal thoughts but then said \"I hope someone fuckin kills me.\"  The patient says she used methamphetamines about a week ago but does not like using them.  She admitted to using Suboxone yesterday.  Review of Systems   Constitutional: Positive for chills and diaphoresis.   Gastrointestinal: Positive for nausea. Negative for diarrhea and vomiting.   Musculoskeletal: Positive for myalgias.   Neurological: Positive for headaches.   Psychiatric/Behavioral: Positive for dysphoric mood.       OBJECTIVE    Temp:  [96.3 °F (35.7 °C)-97.9 °F (36.6 °C)] 97.6 °F (36.4 °C)  Heart Rate:  [71-99] 99  Resp:  [18] 18  BP: ()/(57-88) 134/88    MENTAL STATUS EXAM:  Appearance:Casually dressed, disheveled  Cooperation:Guarded  Psychomotor: No psychomotor agitation/retardation, No EPS, No motor tics  Speech-normal rate, amount.  Mood \"depressed\"   Affect- tearful, irritable  Thought Content-goal directed, no delusional material present  Thought process-linear, organized.  Suicidality: No SI, but says \"I wish someone would fuckin kill me\"   Homicidality: No HI  Perception: No AH/VH  Insight- poor  Judgement-fair    Lab Results (last 24 hours)     ** No results found for the last 24 hours. **             Imaging Results (last 24 hours)     ** No " results found for the last 24 hours. **             ECG/EMG Results (most recent)     Procedure Component Value Units Date/Time    ECG 12 Lead [[id number]] Collected:  17 2255     Updated:  17 1015    Narrative:       Test Reason : Potential adverse reaction to medications.  Blood Pressure : **/** mmHG  Vent. Rate : 082 BPM     Atrial Rate : 082 BPM     P-R Int : 160 ms          QRS Dur : 080 ms      QT Int : 352 ms       P-R-T Axes : 054 048 052 degrees     QTc Int : 411 ms    Normal sinus rhythm  Normal ECG  No previous ECGs available  Confirmed by Baylee Horne ([id number]) on 2017 10:15:30 AM    Referred By:  BERNARDO           Confirmed By:Baylee Horne           ALLERGIES: Review of patient's allergies indicates no known allergies.      Current Facility-Administered Medications:   •  aluminum-magnesium hydroxide-simethicone (MAALOX MAX) 400-400-40 MG/5ML suspension 15 mL, 15 mL, Oral, Q6H PRN, Tomeka Squires MD  •  benzonatate (TESSALON) capsule 100 mg, 100 mg, Oral, TID PRN, Tomeka Squires MD  •  [] CloNIDine (CATAPRES) tablet 0.1 mg, 0.1 mg, Oral, 4x Daily PRN **FOLLOWED BY** [] CloNIDine (CATAPRES) tablet 0.1 mg, 0.1 mg, Oral, TID PRN **FOLLOWED BY** CloNIDine (CATAPRES) tablet 0.1 mg, 0.1 mg, Oral, BID PRN **FOLLOWED BY** [START ON 2017] CloNIDine (CATAPRES) tablet 0.1 mg, 0.1 mg, Oral, Once PRN, Tomeka Squires MD  •  CloNIDine (CATAPRES) tablet 0.1 mg, 0.1 mg, Oral, QAM, Tomeka Squires MD, 0.1 mg at 17 0840  •  cyclobenzaprine (FLEXERIL) tablet 10 mg, 10 mg, Oral, TID PRN, Tomeka Squires MD, 10 mg at 17 0842  •  dicyclomine (BENTYL) capsule 10 mg, 10 mg, Oral, TID PRN, Tomeka Squires MD  •  escitalopram (LEXAPRO) tablet 20 mg, 20 mg, Oral, Daily, Tomeka Squires MD, 20 mg at 17 0840  •  famotidine (PEPCID) tablet 20 mg, 20 mg, Oral, BID PRN, Tomeka Squires MD  •  hydrOXYzine (ATARAX) tablet 50 mg, 50 mg,  Oral, TID PRN, Tomeka Squires MD, 50 mg at 11/08/17 0842  •  ibuprofen (ADVIL,MOTRIN) tablet 600 mg, 600 mg, Oral, Q6H PRN, Tomeka Squires MD, 600 mg at 11/08/17 0842  •  loperamide (IMODIUM) capsule 2 mg, 2 mg, Oral, 4x Daily PRN, Tomeka Squires MD  •  magnesium hydroxide (MILK OF MAGNESIA) suspension 2400 mg/10mL 10 mL, 10 mL, Oral, Daily PRN, Tomeka Squires MD  •  nicotine (NICODERM CQ) 21 MG/24HR patch 1 patch, 1 patch, Transdermal, Daily, Tomeka Squires MD, 1 patch at 11/08/17 0840  •  ondansetron (ZOFRAN) tablet 4 mg, 4 mg, Oral, TID PRN, Tomeka Squires MD  •  ondansetron (ZOFRAN) tablet 4 mg, 4 mg, Oral, Q6H PRN, Tomeka Squires MD  •  prazosin (MINIPRESS) capsule 3 mg, 3 mg, Oral, Nightly, Hussein Mccurdy MD  •  QUEtiapine (SEROquel) tablet 25 mg, 25 mg, Oral, Nightly, Hussein Mccurdy MD  •  sodium chloride (OCEAN) nasal spray 2 spray, 2 spray, Each Nare, PRN, Tomeka Squires MD  •  traZODone (DESYREL) tablet 50 mg, 50 mg, Oral, Nightly PRN, Tomeka Squires MD    ASSESSMENT & PLAN:    Principal Problem:    Major depressive disorder, recurrent  Plan: Continue hospitalization for safety and stabilization.  Continue Lexapro 20 mg daily.    Active Problems:    Opioid dependence with withdrawal  Plan: Continue clonidine detox protocol and supportive treatment of withdrawal.      Post traumatic stress disorder (PTSD)  Plan: Continue Lexapro 20 mg daily.  Increase prazosin to 3 mg nightly and begin Seroquel 25 mg nightly.      Chronic pain due to injury  Plan: The patient has as needed meloxicam and Flexeril available      Methamphetamine use disorder, mild, abuse    Cannabis use disorder, mild, abuse  Plan: *Monitor and treat withdrawal supportively.  Recommend residential level of care; however the patient does not appear ready for change.      Suicide precautions: Suicide precaution Level 3 (q15 min checks)     Behavioral Health Treatment  Plan and Problem List: I have reviewed and approved the Behavioral Health Treatment Plan and Problem list.  The patient has had a chance to review and agrees with the treatment plan.     Clinician:  Hussein Mccurdy MD  11/08/17  3:29 PM

## 2017-11-08 NOTE — PLAN OF CARE
Problem:  Patient Care Overview (Adult)  Goal: Plan of Care Review  Outcome: Ongoing (interventions implemented as appropriate)    11/08/17 0207   Coping/Psychosocial Response Interventions   Plan Of Care Reviewed With patient   Coping/Psychosocial   Patient Agreement with Plan of Care agrees   Patient Care Overview   Progress no change   Outcome Evaluation   Outcome Summary/Follow up Plan Pt continues to isolate in her room. Rates anxiety 8/10 and depression 6/10. Denies SI/HI and BRITTANY. Rates cravings 8 and c/o uf multiple w/d symptoms.

## 2017-11-08 NOTE — DISCHARGE INSTR - APPOINTMENTS
[address] [address]  [phone]    You have an appointment on Wednesday November 15th at 12:30 p.m. With Cj

## 2017-11-09 PROCEDURE — 99232 SBSQ HOSP IP/OBS MODERATE 35: CPT | Performed by: PSYCHIATRY & NEUROLOGY

## 2017-11-09 RX ORDER — MELOXICAM 7.5 MG/1
15 TABLET ORAL DAILY PRN
Status: DISCONTINUED | OUTPATIENT
Start: 2017-11-09 | End: 2017-11-10 | Stop reason: HOSPADM

## 2017-11-09 RX ORDER — ARIPIPRAZOLE 10 MG/1
5 TABLET ORAL DAILY
Status: DISCONTINUED | OUTPATIENT
Start: 2017-11-09 | End: 2017-11-10 | Stop reason: HOSPADM

## 2017-11-09 RX ORDER — PREGABALIN 75 MG/1
150 CAPSULE ORAL EVERY 12 HOURS SCHEDULED
Status: DISCONTINUED | OUTPATIENT
Start: 2017-11-09 | End: 2017-11-10 | Stop reason: HOSPADM

## 2017-11-09 RX ADMIN — PRAZOSIN HYDROCHLORIDE 3 MG: 1 CAPSULE ORAL at 21:19

## 2017-11-09 RX ADMIN — NICOTINE 1 PATCH: 21 PATCH TRANSDERMAL at 08:59

## 2017-11-09 RX ADMIN — HYDROXYZINE HYDROCHLORIDE 50 MG: 50 TABLET ORAL at 08:59

## 2017-11-09 RX ADMIN — IBUPROFEN 600 MG: 600 TABLET ORAL at 08:59

## 2017-11-09 RX ADMIN — ARIPIPRAZOLE 5 MG: 10 TABLET ORAL at 12:21

## 2017-11-09 RX ADMIN — BENZONATATE 100 MG: 100 CAPSULE, LIQUID FILLED ORAL at 16:53

## 2017-11-09 RX ADMIN — PREGABALIN 150 MG: 75 CAPSULE ORAL at 21:19

## 2017-11-09 RX ADMIN — ESCITALOPRAM 20 MG: 10 TABLET, FILM COATED ORAL at 08:57

## 2017-11-09 RX ADMIN — CLONIDINE HYDROCHLORIDE 0.1 MG: 0.1 TABLET ORAL at 08:57

## 2017-11-09 RX ADMIN — PREGABALIN 150 MG: 75 CAPSULE ORAL at 12:22

## 2017-11-09 RX ADMIN — HYDROXYZINE HYDROCHLORIDE 50 MG: 50 TABLET ORAL at 16:52

## 2017-11-09 NOTE — PLAN OF CARE
Problem:  Patient Care Overview (Adult)  Goal: Plan of Care Review  Outcome: Ongoing (interventions implemented as appropriate)    11/09/17 0108   Coping/Psychosocial Response Interventions   Plan Of Care Reviewed With patient   Coping/Psychosocial   Patient Agreement with Plan of Care agrees   Patient Care Overview   Progress no change   Outcome Evaluation   Outcome Summary/Follow up Plan Pt continues to isolate in her room. Rates anxiety and depression both 9/10. Denies SI/HI and BRITTANY. Rates cravings 9 and c/o of multiple w/d symptoms.
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
## 2017-11-09 NOTE — PLAN OF CARE
"Problem:  Patient Care Overview (Adult)  Goal: Discharge Needs Assessment  Outcome: Ongoing (interventions implemented as appropriate)    11/07/17 1448 11/08/17 1035   Discharge Needs Assessment   Concerns To Be Addressed --  coping/stress concerns;mental health concerns   Readmission Within The Last 30 Days no previous admission in last 30 days --    Community [facility] --    Current Discharge Risk psychiatric illness --    Discharge Planning Comments Patient has insurance and will need assistance with transportation upon stabilization. --    Discharge Needs Assessment   Outpatient/Agency/Support Group Needs outpatient counseling;outpatient medication management;outpatient psychiatric care (specify) --    Anticipated Discharge Disposition home with family --    Living Environment   Transportation Available none --       DATA: Met with patient who was resting in her bed.  She reported that she feels she has no energy, she reports that she feels as if she has bronchitis and states she has had it before and knows what it feels like.  She reports that she also feels depressed and has not been getting out of her room.  Encouraged patient to ask the doctor to listen to her lungs tomorrow.  Encouraged patient to push herself to attend a group or 2 tomorrow in order to help herself.  She reports that she can walk some to help her cope and she also enjoys reading.  She reported that the doctor is trying to talk her into going to residential and she inquired \"what kind of residential\".  Discussed with patient that this would be for substance abuse issues, she then inquired about where it would be and if she could take her medication.  Encouraged her to consider but she stated that she thinks she will just return home with her mother and work on getting her a place.  She reports that she did talk to her mother on the phone and that the phone conversation went well. She reported that her mother told her to stay " here until she is stable on her medication.     ASSESSMENT:  Patient has been isolating to her room, she reports ongoing depression, suicidal ideation and pain.  She appears to have minimal insight.     PLAN:  Patient will continue stabilization. She reports she plans to return home with her mother upon stabilization.  She consents to [facility].

## 2017-11-09 NOTE — PLAN OF CARE
Problem:  Patient Care Overview (Adult)  Goal: Plan of Care Review  Outcome: Ongoing (interventions implemented as appropriate)    11/09/17 1723   Coping/Psychosocial Response Interventions   Plan Of Care Reviewed With patient   Coping/Psychosocial   Patient Agreement with Plan of Care agrees   Patient Care Overview   Progress no change   Outcome Evaluation   Outcome Summary/Follow up Plan CONTINUES TO STAY IN ROOM OFTEN AND REPORTS HIGH ANXIETY AND DEPRESSION. NO BEHAVIOR OUTBURSTS OR ISSUES DURING SHIFT.         Problem:  Overarching Goals  Goal: Adheres to Safety Considerations for Self and Others  Outcome: Ongoing (interventions implemented as appropriate)  Goal: Optimized Coping Skills in Response to Life Stressors  Outcome: Ongoing (interventions implemented as appropriate)  Goal: Develops/Participates in Therapeutic [place] to Support Successful Transition  Outcome: Ongoing (interventions implemented as appropriate)

## 2017-11-10 VITALS
TEMPERATURE: 98.4 F | DIASTOLIC BLOOD PRESSURE: 76 MMHG | HEIGHT: 67 IN | WEIGHT: 207.4 LBS | BODY MASS INDEX: 32.55 KG/M2 | RESPIRATION RATE: 18 BRPM | HEART RATE: 103 BPM | SYSTOLIC BLOOD PRESSURE: 129 MMHG | OXYGEN SATURATION: 97 %

## 2017-11-10 PROCEDURE — 99238 HOSP IP/OBS DSCHRG MGMT 30/<: CPT | Performed by: PSYCHIATRY & NEUROLOGY

## 2017-11-10 RX ORDER — ESCITALOPRAM OXALATE 20 MG/1
20 TABLET ORAL DAILY
Qty: 30 TABLET | Refills: 0 | Status: SHIPPED | OUTPATIENT
Start: 2017-11-11

## 2017-11-10 RX ORDER — ARIPIPRAZOLE 5 MG/1
5 TABLET ORAL DAILY
Qty: 30 TABLET | Refills: 0 | Status: SHIPPED | OUTPATIENT
Start: 2017-11-11 | End: 2017-11-20 | Stop reason: HOSPADM

## 2017-11-10 RX ORDER — PRAZOSIN HYDROCHLORIDE 1 MG/1
3 CAPSULE ORAL NIGHTLY
Qty: 90 CAPSULE | Refills: 0 | Status: SHIPPED | OUTPATIENT
Start: 2017-11-10 | End: 2017-11-20 | Stop reason: HOSPADM

## 2017-11-10 RX ADMIN — HYDROXYZINE HYDROCHLORIDE 50 MG: 50 TABLET ORAL at 12:42

## 2017-11-10 RX ADMIN — NICOTINE 1 PATCH: 21 PATCH TRANSDERMAL at 08:31

## 2017-11-10 RX ADMIN — ESCITALOPRAM 20 MG: 10 TABLET, FILM COATED ORAL at 08:30

## 2017-11-10 RX ADMIN — PREGABALIN 150 MG: 75 CAPSULE ORAL at 08:31

## 2017-11-10 RX ADMIN — ARIPIPRAZOLE 5 MG: 10 TABLET ORAL at 08:31
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
## 2017-11-10 NOTE — PLAN OF CARE
"Problem:  Patient Care Overview (Adult)  Goal: Plan of Care Review  Outcome: Ongoing (interventions implemented as appropriate)    11/10/17 0129   Coping/Psychosocial Response Interventions   Plan Of Care Reviewed With patient   Coping/Psychosocial   Patient Agreement with Plan of Care agrees   Patient Care Overview   Progress no change   Outcome Evaluation   Outcome Summary/Follow up Plan Pt continues to isolate in her room. Rates anxiety 8/10 and depression 5/10. Denies SI/HI and BRITTANY. Rates cravings 5 and cstates she is \"just sick.\"           "

## 2017-11-15 ENCOUNTER — HOSPITAL ENCOUNTER (INPATIENT)
Facility: HOSPITAL | Age: 27
LOS: 5 days | Discharge: HOME | End: 2017-11-20
Attending: PSYCHIATRY & NEUROLOGY | Admitting: PSYCHIATRY & NEUROLOGY

## 2017-11-15 ENCOUNTER — HOSPITAL ENCOUNTER (EMERGENCY)
Facility: HOSPITAL | Age: 27
Discharge: STILL ADMITTED OR ADMINISTRATIVE | End: 2017-11-15
Attending: EMERGENCY MEDICINE

## 2017-11-15 VITALS
WEIGHT: 200 LBS | RESPIRATION RATE: 18 BRPM | TEMPERATURE: 98 F | BODY MASS INDEX: 31.39 KG/M2 | HEART RATE: 68 BPM | HEIGHT: 67 IN | DIASTOLIC BLOOD PRESSURE: 72 MMHG | SYSTOLIC BLOOD PRESSURE: 110 MMHG | OXYGEN SATURATION: 100 %

## 2017-11-15 DIAGNOSIS — F41.9 ANXIETY: ICD-10-CM

## 2017-11-15 DIAGNOSIS — R45.851 SUICIDAL IDEATIONS: Primary | ICD-10-CM

## 2017-11-15 PROBLEM — F32.A DEPRESSION WITH SUICIDAL IDEATION: Status: ACTIVE | Noted: 2017-11-15

## 2017-11-15 LAB
6-ACETYL MORPHINE: NEGATIVE
ALBUMIN SERPL-MCNC: 4.1 G/DL (ref 3.5–5)
ALBUMIN/GLOB SERPL: 1.5 G/DL (ref 1.5–2.5)
ALP SERPL-CCNC: 96 U/L (ref 35–104)
ALT SERPL W P-5'-P-CCNC: 21 U/L (ref 10–36)
AMPHET+METHAMPHET UR QL: NEGATIVE
ANION GAP SERPL CALCULATED.3IONS-SCNC: 3.4 MMOL/L (ref 3.6–11.2)
AST SERPL-CCNC: 20 U/L (ref 10–30)
B-HCG UR QL: NEGATIVE
BACTERIA UR QL AUTO: ABNORMAL /HPF
BARBITURATES UR QL SCN: NEGATIVE
BASOPHILS # BLD AUTO: 0.03 10*3/MM3 (ref 0–0.3)
BASOPHILS NFR BLD AUTO: 0.2 % (ref 0–2)
BENZODIAZ UR QL SCN: NEGATIVE
BILIRUB SERPL-MCNC: 0.2 MG/DL (ref 0.2–1.8)
BILIRUB UR QL STRIP: NEGATIVE
BUN BLD-MCNC: 7 MG/DL (ref 7–21)
BUN/CREAT SERPL: 12.1 (ref 7–25)
BUPRENORPHINE SERPL-MCNC: POSITIVE NG/ML
CALCIUM SPEC-SCNC: 8.9 MG/DL (ref 7.7–10)
CANNABINOIDS SERPL QL: POSITIVE
CHLORIDE SERPL-SCNC: 111 MMOL/L (ref 99–112)
CLARITY UR: CLEAR
CO2 SERPL-SCNC: 22.6 MMOL/L (ref 24.3–31.9)
COCAINE UR QL: NEGATIVE
COLOR UR: YELLOW
CREAT BLD-MCNC: 0.58 MG/DL (ref 0.43–1.29)
DEPRECATED RDW RBC AUTO: 44.5 FL (ref 37–54)
EOSINOPHIL # BLD AUTO: 0.29 10*3/MM3 (ref 0–0.7)
EOSINOPHIL NFR BLD AUTO: 2.3 % (ref 0–5)
ERYTHROCYTE [DISTWIDTH] IN BLOOD BY AUTOMATED COUNT: 14 % (ref 11.5–14.5)
ETHANOL BLD-MCNC: <10 MG/DL
ETHANOL UR QL: <0.01 %
GFR SERPL CREATININE-BSD FRML MDRD: 123 ML/MIN/1.73
GLOBULIN UR ELPH-MCNC: 2.8 GM/DL
GLUCOSE BLD-MCNC: 80 MG/DL (ref 70–110)
GLUCOSE UR STRIP-MCNC: NEGATIVE MG/DL
HCT VFR BLD AUTO: 41.8 % (ref 37–47)
HGB BLD-MCNC: 13.8 G/DL (ref 12–16)
HGB UR QL STRIP.AUTO: ABNORMAL
HYALINE CASTS UR QL AUTO: ABNORMAL /LPF
IMM GRANULOCYTES # BLD: 0.03 10*3/MM3 (ref 0–0.03)
IMM GRANULOCYTES NFR BLD: 0.2 % (ref 0–0.5)
KETONES UR QL STRIP: NEGATIVE
LEUKOCYTE ESTERASE UR QL STRIP.AUTO: NEGATIVE
LYMPHOCYTES # BLD AUTO: 3.31 10*3/MM3 (ref 1–3)
LYMPHOCYTES NFR BLD AUTO: 25.8 % (ref 21–51)
MCH RBC QN AUTO: 29.1 PG (ref 27–33)
MCHC RBC AUTO-ENTMCNC: 33 G/DL (ref 33–37)
MCV RBC AUTO: 88 FL (ref 80–94)
METHADONE UR QL SCN: NEGATIVE
MONOCYTES # BLD AUTO: 1.17 10*3/MM3 (ref 0.1–0.9)
MONOCYTES NFR BLD AUTO: 9.1 % (ref 0–10)
NEUTROPHILS # BLD AUTO: 8.01 10*3/MM3 (ref 1.4–6.5)
NEUTROPHILS NFR BLD AUTO: 62.4 % (ref 30–70)
NITRITE UR QL STRIP: NEGATIVE
OPIATES UR QL: NEGATIVE
OSMOLALITY SERPL CALC.SUM OF ELEC: 270.8 MOSM/KG (ref 273–305)
OXYCODONE UR QL SCN: NEGATIVE
PCP UR QL SCN: NEGATIVE
PH UR STRIP.AUTO: 6.5 [PH] (ref 5–8)
PLATELET # BLD AUTO: 334 10*3/MM3 (ref 130–400)
PMV BLD AUTO: 10.8 FL (ref 6–10)
POTASSIUM BLD-SCNC: 4.1 MMOL/L (ref 3.5–5.3)
PROT SERPL-MCNC: 6.9 G/DL (ref 6–8)
PROT UR QL STRIP: NEGATIVE
RBC # BLD AUTO: 4.75 10*6/MM3 (ref 4.2–5.4)
RBC # UR: ABNORMAL /HPF
REF LAB TEST METHOD: ABNORMAL
SODIUM BLD-SCNC: 137 MMOL/L (ref 135–153)
SP GR UR STRIP: 1.02 (ref 1–1.03)
SQUAMOUS #/AREA URNS HPF: ABNORMAL /HPF
UROBILINOGEN UR QL STRIP: ABNORMAL
WBC NRBC COR # BLD: 12.84 10*3/MM3 (ref 4.5–12.5)
WBC UR QL AUTO: ABNORMAL /HPF

## 2017-11-15 PROCEDURE — 90732 PPSV23 VACC 2 YRS+ SUBQ/IM: CPT | Performed by: PSYCHIATRY & NEUROLOGY

## 2017-11-15 PROCEDURE — 80307 DRUG TEST PRSMV CHEM ANLYZR: CPT | Performed by: PHYSICIAN ASSISTANT

## 2017-11-15 PROCEDURE — 80053 COMPREHEN METABOLIC PANEL: CPT | Performed by: PHYSICIAN ASSISTANT

## 2017-11-15 PROCEDURE — G0009 ADMIN PNEUMOCOCCAL VACCINE: HCPCS | Performed by: PSYCHIATRY & NEUROLOGY

## 2017-11-15 PROCEDURE — 93010 ELECTROCARDIOGRAM REPORT: CPT | Performed by: INTERNAL MEDICINE

## 2017-11-15 PROCEDURE — G0008 ADMIN INFLUENZA VIRUS VAC: HCPCS | Performed by: PSYCHIATRY & NEUROLOGY

## 2017-11-15 PROCEDURE — 25010000002 INFLUENZA VAC SPLIT QUAD 0.5 ML SUSPENSION PREFILLED SYRINGE: Performed by: PSYCHIATRY & NEUROLOGY

## 2017-11-15 PROCEDURE — 85025 COMPLETE CBC W/AUTO DIFF WBC: CPT | Performed by: PHYSICIAN ASSISTANT

## 2017-11-15 PROCEDURE — 25010000002 PNEUMOCOCCAL VAC POLYVALENT PER 0.5 ML: Performed by: PSYCHIATRY & NEUROLOGY

## 2017-11-15 PROCEDURE — 81025 URINE PREGNANCY TEST: CPT | Performed by: PHYSICIAN ASSISTANT

## 2017-11-15 PROCEDURE — 90686 IIV4 VACC NO PRSV 0.5 ML IM: CPT | Performed by: PSYCHIATRY & NEUROLOGY

## 2017-11-15 PROCEDURE — 93005 ELECTROCARDIOGRAM TRACING: CPT | Performed by: PSYCHIATRY & NEUROLOGY

## 2017-11-15 PROCEDURE — 81001 URINALYSIS AUTO W/SCOPE: CPT | Performed by: PHYSICIAN ASSISTANT

## 2017-11-15 RX ORDER — BENZONATATE 100 MG/1
100 CAPSULE ORAL 3 TIMES DAILY PRN
Status: DISCONTINUED | OUTPATIENT
Start: 2017-11-15 | End: 2017-11-20 | Stop reason: HOSPADM

## 2017-11-15 RX ORDER — ONDANSETRON 4 MG/1
4 TABLET, FILM COATED ORAL EVERY 6 HOURS PRN
Status: DISCONTINUED | OUTPATIENT
Start: 2017-11-15 | End: 2017-11-20 | Stop reason: HOSPADM

## 2017-11-15 RX ORDER — ECHINACEA PURPUREA EXTRACT 125 MG
2 TABLET ORAL AS NEEDED
Status: DISCONTINUED | OUTPATIENT
Start: 2017-11-15 | End: 2017-11-20 | Stop reason: HOSPADM

## 2017-11-15 RX ORDER — TRAZODONE HYDROCHLORIDE 50 MG/1
50 TABLET ORAL NIGHTLY PRN
Status: DISCONTINUED | OUTPATIENT
Start: 2017-11-15 | End: 2017-11-17

## 2017-11-15 RX ORDER — HYDROXYZINE 50 MG/1
50 TABLET, FILM COATED ORAL EVERY 6 HOURS PRN
Status: DISCONTINUED | OUTPATIENT
Start: 2017-11-15 | End: 2017-11-20 | Stop reason: HOSPADM

## 2017-11-15 RX ORDER — PRAZOSIN HYDROCHLORIDE 1 MG/1
3 CAPSULE ORAL NIGHTLY
Status: DISCONTINUED | OUTPATIENT
Start: 2017-11-15 | End: 2017-11-20 | Stop reason: HOSPADM

## 2017-11-15 RX ORDER — FAMOTIDINE 20 MG/1
20 TABLET, FILM COATED ORAL 2 TIMES DAILY PRN
Status: DISCONTINUED | OUTPATIENT
Start: 2017-11-15 | End: 2017-11-20 | Stop reason: HOSPADM

## 2017-11-15 RX ORDER — ALUMINA, MAGNESIA, AND SIMETHICONE 2400; 2400; 240 MG/30ML; MG/30ML; MG/30ML
15 SUSPENSION ORAL EVERY 6 HOURS PRN
Status: DISCONTINUED | OUTPATIENT
Start: 2017-11-15 | End: 2017-11-20 | Stop reason: HOSPADM

## 2017-11-15 RX ORDER — IBUPROFEN 400 MG/1
800 TABLET ORAL EVERY 8 HOURS PRN
Status: CANCELLED | OUTPATIENT
Start: 2017-11-15

## 2017-11-15 RX ORDER — ARIPIPRAZOLE 10 MG/1
5 TABLET ORAL DAILY
Status: DISCONTINUED | OUTPATIENT
Start: 2017-11-16 | End: 2017-11-20

## 2017-11-15 RX ORDER — PREGABALIN 75 MG/1
300 CAPSULE ORAL 2 TIMES DAILY
Status: DISCONTINUED | OUTPATIENT
Start: 2017-11-15 | End: 2017-11-20 | Stop reason: HOSPADM

## 2017-11-15 RX ORDER — IBUPROFEN 600 MG/1
600 TABLET ORAL EVERY 6 HOURS PRN
Status: DISCONTINUED | OUTPATIENT
Start: 2017-11-15 | End: 2017-11-20 | Stop reason: HOSPADM

## 2017-11-15 RX ORDER — CYCLOBENZAPRINE HCL 10 MG
10 TABLET ORAL EVERY 8 HOURS PRN
Status: DISCONTINUED | OUTPATIENT
Start: 2017-11-15 | End: 2017-11-20 | Stop reason: HOSPADM

## 2017-11-15 RX ORDER — ESCITALOPRAM OXALATE 10 MG/1
20 TABLET ORAL DAILY
Status: DISCONTINUED | OUTPATIENT
Start: 2017-11-16 | End: 2017-11-20 | Stop reason: HOSPADM

## 2017-11-15 RX ORDER — LOPERAMIDE HYDROCHLORIDE 2 MG/1
2 CAPSULE ORAL 4 TIMES DAILY PRN
Status: DISCONTINUED | OUTPATIENT
Start: 2017-11-15 | End: 2017-11-17 | Stop reason: SDUPTHER

## 2017-11-15 RX ORDER — NICOTINE 21 MG/24HR
1 PATCH, TRANSDERMAL 24 HOURS TRANSDERMAL DAILY
Status: DISCONTINUED | OUTPATIENT
Start: 2017-11-15 | End: 2017-11-20 | Stop reason: HOSPADM

## 2017-11-15 RX ADMIN — IBUPROFEN 600 MG: 600 TABLET ORAL at 16:54

## 2017-11-15 RX ADMIN — NICOTINE 1 PATCH: 21 PATCH TRANSDERMAL at 17:58

## 2017-11-15 RX ADMIN — HYDROXYZINE HYDROCHLORIDE 50 MG: 50 TABLET ORAL at 16:54

## 2017-11-15 RX ADMIN — PREGABALIN 300 MG: 75 CAPSULE ORAL at 17:58

## 2017-11-15 RX ADMIN — INFLUENZA VIRUS VACCINE 0.5 ML: 15; 15; 15; 15 SUSPENSION INTRAMUSCULAR at 17:53

## 2017-11-15 RX ADMIN — PNEUMOCOCCAL VACCINE POLYVALENT 0.5 ML
25; 25; 25; 25; 25; 25; 25; 25; 25; 25; 25; 25; 25; 25; 25; 25; 25; 25; 25; 25; 25; 25; 25 INJECTION, SOLUTION INTRAMUSCULAR; SUBCUTANEOUS at 17:55

## 2017-11-15 NOTE — ED PROVIDER NOTES
Subjective   HPI Comments: 27-year-old female presents to the ED today for mental health evaluation.  She states she is having increasing anxiety.  She was recently discharged from the [facility] 5 days ago and her symptoms have progressively gotten worse.  She states she has been taking sleeping pills to help cope with her anxiety.  She states she just wants to sleep all the times that she doesn't have to feel like this.  She states she has been taking doxepin and Seroquel.  She states she does have suicidal ideations when she first wakes up.  She states she has thought about overdosing but does not think she would actually act on it.  She denies any homicidal ideations.  She does admit to using marijuana but denies any alcohol use.  She denies any hallucinations.  She is currently on her menstrual period.  She states she lives with her mother and brothers.    Patient is a 27 y.o. female presenting with mental health disorder.   History provided by:  Patient  Mental Health Problem   Presenting symptoms: suicidal thoughts    Presenting symptoms: no hallucinations    Degree of incapacity (severity):  Moderate  Onset quality:  Gradual  Duration:  5 days  Timing:  Constant  Progression:  Worsening  Chronicity:  Recurrent  Context: drug abuse (marijuana) and medication (taking numerous sleeping pills)    Context: not alcohol use    Relieved by:  Nothing  Worsened by:  Nothing  Ineffective treatments:  Sleep  Associated symptoms: anxiety and poor judgment    Associated symptoms: no appetite change and no insomnia    Risk factors: hx of mental illness and recent psychiatric admission        Review of Systems   Constitutional: Negative for appetite change.   HENT: Negative.    Eyes: Negative.    Respiratory: Negative.    Cardiovascular: Negative.    Gastrointestinal: Negative.    Genitourinary: Negative.    Musculoskeletal: Negative.    Skin: Negative.    Neurological: Negative.    Psychiatric/Behavioral: Positive for  "suicidal ideas. Negative for dysphoric mood and hallucinations. The patient is nervous/anxious. The patient does not have insomnia.    All other systems reviewed and are negative.      Past Medical History:   Diagnosis Date   • Bipolar disorder    • Depression    • Hepatitis C    • History of nightmares    • Meningitis spinal     Age 12   • Panic disorder    • PTSD (post-traumatic stress disorder)     Visual Disturbance    • Substance abuse    • Suicide attempt     July 2016-\"I took a whole bottle of unisom.\"       No Known Allergies    Past Surgical History:   Procedure Laterality Date   • FOOT SURGERY Left 2014       Family History   Problem Relation Age of Onset   • Depression Mother    • ADD / ADHD Sister    • Alcohol abuse Sister    • Depression Sister    • Drug abuse Sister    • Asperger's syndrome Brother    • No Known Problems Maternal Aunt    • No Known Problems Paternal Aunt    • No Known Problems Maternal Uncle    • No Known Problems Paternal Uncle    • No Known Problems Maternal Grandfather    • No Known Problems Maternal Grandmother    • No Known Problems Paternal Grandfather    • No Known Problems Paternal Grandmother    • No Known Problems Cousin    • Depression Other        Social History     Social History   • Marital status: Single     Spouse name: N/A   • Number of children: N/A   • Years of education: N/A     Social History Main Topics   • Smoking status: Current Every Day Smoker     Packs/day: 1.00     Years: 10.00     Types: Cigarettes   • Smokeless tobacco: Never Used      Comment: Pt. declines counseling at this time.    • Alcohol use No   • Drug use: Yes     Special: Other, Marijuana      Comment: Suboxone, Opiate   • Sexual activity: Defer     Other Topics Concern   • None     Social History Narrative           Objective   Physical Exam   Constitutional: She is oriented to person, place, and time. She appears well-developed and well-nourished. No distress.   HENT:   Head: Normocephalic and " atraumatic.   Right Ear: External ear normal.   Left Ear: External ear normal.   Nose: Nose normal.   Mouth/Throat: Oropharynx is clear and moist.   Eyes: Conjunctivae and EOM are normal. Pupils are equal, round, and reactive to light.   Neck: Normal range of motion. Neck supple.   Cardiovascular: Normal rate, regular rhythm, normal heart sounds and intact distal pulses.    Pulmonary/Chest: Effort normal and breath sounds normal. No respiratory distress.   Abdominal: Soft. Bowel sounds are normal. There is no tenderness.   Musculoskeletal: Normal range of motion.   Neurological: She is alert and oriented to person, place, and time.   Skin: Skin is warm and dry.   Psychiatric: Her speech is normal and behavior is normal. Her mood appears anxious. Cognition and memory are normal. She expresses impulsivity. She exhibits a depressed mood. She expresses suicidal ideation. She expresses no homicidal ideation. She expresses suicidal plans.   Nursing note and vitals reviewed.      Procedures         ED Course  ED Course   Comment By Time   Medically clear for psych CULLEN Meyers 11/15 1526                  MDM  Number of Diagnoses or Management Options  Anxiety:   Suicidal ideations:      Amount and/or Complexity of Data Reviewed  Clinical lab tests: reviewed    Patient Progress  Patient progress: stable      Final diagnoses:   Suicidal ideations   Anxiety            CULLEN Meyers  11/15/17 1037

## 2017-11-16 PROCEDURE — 99222 1ST HOSP IP/OBS MODERATE 55: CPT | Performed by: PSYCHIATRY & NEUROLOGY

## 2017-11-16 RX ADMIN — PREGABALIN 300 MG: 75 CAPSULE ORAL at 17:57

## 2017-11-16 RX ADMIN — PRAZOSIN HYDROCHLORIDE 3 MG: 1 CAPSULE ORAL at 21:33

## 2017-11-16 RX ADMIN — CYCLOBENZAPRINE HYDROCHLORIDE 10 MG: 10 TABLET, FILM COATED ORAL at 21:33

## 2017-11-16 RX ADMIN — PREGABALIN 300 MG: 75 CAPSULE ORAL at 08:11

## 2017-11-16 RX ADMIN — ARIPIPRAZOLE 5 MG: 10 TABLET ORAL at 08:11

## 2017-11-16 RX ADMIN — TRAZODONE HYDROCHLORIDE 50 MG: 50 TABLET ORAL at 21:33

## 2017-11-16 RX ADMIN — ESCITALOPRAM OXALATE 20 MG: 10 TABLET, FILM COATED ORAL at 08:11

## 2017-11-16 RX ADMIN — IBUPROFEN 600 MG: 600 TABLET ORAL at 21:33

## 2017-11-16 RX ADMIN — HYDROXYZINE HYDROCHLORIDE 50 MG: 50 TABLET ORAL at 17:58

## 2017-11-16 RX ADMIN — HYDROXYZINE HYDROCHLORIDE 50 MG: 50 TABLET ORAL at 10:59

## 2017-11-16 RX ADMIN — NICOTINE 1 PATCH: 21 PATCH TRANSDERMAL at 08:11

## 2017-11-16 NOTE — PLAN OF CARE
Problem: BH Patient Care Overview (Adult)  Goal: Plan of Care Review  Outcome: Ongoing (interventions implemented as appropriate)    11/16/17 0638 11/16/17 0745   Coping/Psychosocial Response Interventions   Plan Of Care Reviewed With --  patient   Coping/Psychosocial   Patient Agreement with Plan of Care --  agrees   Patient Care Overview   Progress no change --        Goal: Individualization and Mutuality  Outcome: Ongoing (interventions implemented as appropriate)    11/16/17 1018   Behavioral Health Screens   Patient Personal Strengths motivated for treatment;expressive of needs;spiritual/Faith support   Patient Vulnerabilities ineffective coping, mental health issues, family dynamics, substance use       Goal: Discharge Needs Assessment  Outcome: Ongoing (interventions implemented as appropriate)    11/16/17 1032   Discharge Needs Assessment   Concerns To Be Addressed coping/stress concerns;employment/school concerns;mental health concerns;substance/tobacco abuse/use concerns;suicidal concerns   Readmission Within The Last 30 Days previous discharge plan unsuccessful   Community Agency Name(S) [facility]   Current Discharge Risk psychiatric illness;financial support inadequate;lack of support system/caregiver;substance abuse   Discharge Planning Comments Patient has insurance for medications and has difficulty with transportation-Kelly GRAF assisting   Discharge Needs Assessment   Outpatient/Agency/Support Group Needs case management;other (see comments)  ([facility]   Anticipated Discharge Disposition other (see comments)  ([facility])   Living Environment   Transportation Available agency transportation      DATA: Met with patient initially to complete initial assessment, social history, integrated summary, review care planning and disposition discussion.  Patient is a 27 year old  female with a history of admission one week ago and also several other  admissions. patient presents suicidal with a plan to overdose.  She reports that her anxiety is very high outside of the hospital and she relates it to the situation with her mother. She reports that she did make it to her follow up appoinment with [facility] because the  picked her up and took her.  She reports that she would not have gone if the  did not transport her.  She reports that the  is assisting her with finding other options for housing.  Patient requests a referral to the [facility] upon her stabilization here.  She reports issues with unemployment, medical condition and family dynamics as her stressors.  She admits to using marijuana and some suboxone but denies this is an issue.  Patient signed a release for referral today to the [facility] upon her stabilization and patient has case management with Kelly Alexander from [facility].     ASSESSMENT:  Patient presents with suicidal ideation and a plan to overdose.   Patient reports acute increase in depression and anxiety.  Patient is a danger to self and requires further hospitalization for stabilization of symptoms.     PLAN:  Patient will continue stabilization.  Patient will engage in individual and group therapy to address coping and review crisis safety planning as well as appropriate disposition.  Patient is verbalizing a plan currently to attend the [facility] upon stabilization-consent signed.

## 2017-11-16 NOTE — PLAN OF CARE
Problem:  Patient Care Overview (Adult)  Goal: Plan of Care Review  Outcome: Ongoing (interventions implemented as appropriate)    11/16/17 1214   Coping/Psychosocial Response Interventions   Plan Of Care Reviewed With patient   Coping/Psychosocial   Patient Agreement with Plan of Care agrees   Patient Care Overview   Progress improving   Outcome Evaluation   Outcome Summary/Follow up Plan Pt rated anxiety 2 and depression 0. Pt was not suicidal or homicidal. Pt stated she ate and slept good yesterday and has no other c/o.         Problem:  Overarching Goals  Goal: Adheres to Safety Considerations for Self and Others  Outcome: Ongoing (interventions implemented as appropriate)  Goal: Optimized Coping Skills in Response to Life Stressors  Outcome: Ongoing (interventions implemented as appropriate)  Goal: Develops/Participates in Therapeutic [place] to Support Successful Transition  Outcome: Ongoing (interventions implemented as appropriate)
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
## 2017-11-16 NOTE — PLAN OF CARE
Problem: BH Patient Care Overview (Adult)  Goal: Plan of Care Review  Outcome: Ongoing (interventions implemented as appropriate)    11/16/17 0638   Coping/Psychosocial Response Interventions   Plan Of Care Reviewed With patient   Coping/Psychosocial   Patient Agreement with Plan of Care agrees   Patient Care Overview   Progress no change   Outcome Evaluation   Outcome Summary/Follow up Plan Pt. stable and slept all night. Withdrawn and stayed in her room/bed almost the entire shift. Cooperative. Denies SI.

## 2017-11-17 PROCEDURE — 25010000002 ZIPRASIDONE MESYLATE PER 10 MG

## 2017-11-17 PROCEDURE — 99232 SBSQ HOSP IP/OBS MODERATE 35: CPT | Performed by: PSYCHIATRY & NEUROLOGY

## 2017-11-17 RX ORDER — WATER 1000 ML/1000ML
INJECTION, SOLUTION INTRAVENOUS
Status: COMPLETED
Start: 2017-11-17 | End: 2017-11-17

## 2017-11-17 RX ORDER — LOPERAMIDE HYDROCHLORIDE 2 MG/1
2 CAPSULE ORAL 4 TIMES DAILY PRN
Status: DISCONTINUED | OUTPATIENT
Start: 2017-11-17 | End: 2017-11-20 | Stop reason: HOSPADM

## 2017-11-17 RX ORDER — DIAPER,BRIEF,INFANT-TODD,DISP
EACH MISCELLANEOUS EVERY 12 HOURS SCHEDULED
Status: DISCONTINUED | OUTPATIENT
Start: 2017-11-17 | End: 2017-11-20 | Stop reason: HOSPADM

## 2017-11-17 RX ORDER — ZIPRASIDONE MESYLATE 20 MG/ML
10 INJECTION, POWDER, LYOPHILIZED, FOR SOLUTION INTRAMUSCULAR ONCE
Status: COMPLETED | OUTPATIENT
Start: 2017-11-17 | End: 2017-11-17

## 2017-11-17 RX ORDER — OLANZAPINE 5 MG/1
5 TABLET, ORALLY DISINTEGRATING ORAL 4 TIMES DAILY PRN
Status: DISCONTINUED | OUTPATIENT
Start: 2017-11-17 | End: 2017-11-18

## 2017-11-17 RX ORDER — TRAZODONE HYDROCHLORIDE 50 MG/1
100 TABLET ORAL NIGHTLY PRN
Status: DISCONTINUED | OUTPATIENT
Start: 2017-11-17 | End: 2017-11-20 | Stop reason: HOSPADM

## 2017-11-17 RX ORDER — TRIFLUOPERAZINE HYDROCHLORIDE 1 MG/1
1 TABLET, FILM COATED ORAL 2 TIMES DAILY PRN
Status: DISCONTINUED | OUTPATIENT
Start: 2017-11-17 | End: 2017-11-18

## 2017-11-17 RX ORDER — ZIPRASIDONE MESYLATE 20 MG/ML
INJECTION, POWDER, LYOPHILIZED, FOR SOLUTION INTRAMUSCULAR
Status: COMPLETED
Start: 2017-11-17 | End: 2017-11-17

## 2017-11-17 RX ADMIN — ZIPRASIDONE MESYLATE 10 MG: 20 INJECTION, POWDER, LYOPHILIZED, FOR SOLUTION INTRAMUSCULAR at 17:17

## 2017-11-17 RX ADMIN — BACITRACIN ZINC: 500 OINTMENT TOPICAL at 12:27

## 2017-11-17 RX ADMIN — TRIFLUOPERAZINE HYDROCHLORIDE 1 MG: 1 TABLET, FILM COATED ORAL at 13:26

## 2017-11-17 RX ADMIN — HYDROXYZINE HYDROCHLORIDE 50 MG: 50 TABLET ORAL at 16:36

## 2017-11-17 RX ADMIN — PREGABALIN 300 MG: 75 CAPSULE ORAL at 17:02

## 2017-11-17 RX ADMIN — PREGABALIN 300 MG: 75 CAPSULE ORAL at 08:00

## 2017-11-17 RX ADMIN — IBUPROFEN 600 MG: 600 TABLET ORAL at 16:36

## 2017-11-17 RX ADMIN — ARIPIPRAZOLE 5 MG: 10 TABLET ORAL at 08:00

## 2017-11-17 RX ADMIN — NICOTINE 1 PATCH: 21 PATCH TRANSDERMAL at 08:00

## 2017-11-17 RX ADMIN — ESCITALOPRAM OXALATE 20 MG: 10 TABLET, FILM COATED ORAL at 08:00

## 2017-11-17 RX ADMIN — HYDROXYZINE HYDROCHLORIDE 50 MG: 50 TABLET ORAL at 09:27

## 2017-11-17 RX ADMIN — OLANZAPINE 5 MG: 5 TABLET, ORALLY DISINTEGRATING ORAL at 15:43

## 2017-11-17 RX ADMIN — WATER 1.2 ML: 1 INJECTION INTRAMUSCULAR; INTRAVENOUS; SUBCUTANEOUS at 17:17

## 2017-11-17 NOTE — PROGRESS NOTES
"INPATIENT PSYCHIATRIC PROGRESS NOTE    Name:  Sary Mesa  :  1990  MRN:  [id number]  Visit Number:  [id number]  Length of stay:  2    SUBJECTIVE  CC: f/u depression    INTERVAL HISTORY:  The patient was seen for follow-up of depression.  She reports today she is very anxious and slept poorly overnight.  She denied nightmares.  She is not having suicidal thoughts but is anxious and apprehensive about going home.  We made a referral to the [facility] and a bed is still not available there.  Patient also is having some mild opioid withdrawal symptoms as documented below.  Depression rating 6/10  Anxiety rating 7/10  Sleep: poor  Withdrawal sx: See review of systems below      Review of Systems   Constitutional: Positive for chills and diaphoresis.   HENT:        Complains of swelling, redness and pain just inferiorly into the left eye.   Respiratory: Negative.    Cardiovascular: Negative.    Gastrointestinal: Positive for diarrhea and nausea.   Musculoskeletal: Negative.    Neurological: Negative.        OBJECTIVE    Temp:  [97.4 °F (36.3 °C)-97.8 °F (36.6 °C)] 97.4 °F (36.3 °C)  Heart Rate:  [] 115  Resp:  [18] 18  BP: ()/(53-88) 131/88    Physical exam: Mild's edema and erythema in the periorbital area inferior to the left eye.  In the mild exam, there was a cavity noted on the left upper quadrant, but no sign of an abscess or other signs of infection.    MENTAL STATUS EXAM:  Appearance: Messy.  Cooperation:Cooperative  Psychomotor: No psychomotor agitation/retardation, No EPS, No motor tics  Speech-normal rate, amount.  Mood \"anxious \"   Affect- constricted  Thought Content-goal directed, no delusional material present  Thought process-linear, organized.  Suicidality: No SI  Homicidality: No HI  Perception: No AH/VH  Insight- poor  Judgement-fair    Lab Results (last 24 hours)     ** No results found for the last 24 hours. **             Imaging Results (last 24 hours)     ** No results " found for the last 24 hours. **             ECG/EMG Results (most recent)     Procedure Component Value Units Date/Time    ECG 12 Lead [[id number]] Collected:  11/15/17 1734     Updated:  11/16/17 1146    Narrative:       Test Reason : Potential adverse reaction to medications.  Blood Pressure : **/** mmHG  Vent. Rate : 066 BPM     Atrial Rate : 066 BPM     P-R Int : 164 ms          QRS Dur : 090 ms      QT Int : 384 ms       P-R-T Axes : 020 046 044 degrees     QTc Int : 402 ms    Normal sinus rhythm  Normal ECG  When compared with ECG of 06-NOV-2017 22:55,  No significant change was found  Confirmed by Steffen Blount ([id number]) on 11/16/2017 11:46:35 AM    Referred By:  STEFFI           Confirmed By:Steffen Blount           ALLERGIES: Review of patient's allergies indicates no known allergies.      Current Facility-Administered Medications:   •  aluminum-magnesium hydroxide-simethicone (MAALOX MAX) 400-400-40 MG/5ML suspension 15 mL, 15 mL, Oral, Q6H PRN, Janneth Dubose MD  •  ARIPiprazole (ABILIFY) tablet 5 mg, 5 mg, Oral, Daily, Janneth Dubose MD, 5 mg at 11/17/17 0800  •  benzonatate (TESSALON) capsule 100 mg, 100 mg, Oral, TID PRN, Janneth Dubose MD  •  cyclobenzaprine (FLEXERIL) tablet 10 mg, 10 mg, Oral, Q8H PRN, Janneth Dubose MD, 10 mg at 11/16/17 2133  •  escitalopram (LEXAPRO) tablet 20 mg, 20 mg, Oral, Daily, Janneth Dubose MD, 20 mg at 11/17/17 0800  •  famotidine (PEPCID) tablet 20 mg, 20 mg, Oral, BID PRN, Janneth Dubose MD  •  hydrOXYzine (ATARAX) tablet 50 mg, 50 mg, Oral, Q6H PRN, Janneth Dubose MD, 50 mg at 11/17/17 0927  •  ibuprofen (ADVIL,MOTRIN) tablet 600 mg, 600 mg, Oral, Q6H PRN, Janneth Dubose MD, 600 mg at 11/16/17 2133  •  loperamide (IMODIUM) capsule 2 mg, 2 mg, Oral, 4x Daily PRN, Janneth Dubose MD  •  loperamide (IMODIUM) capsule 2 mg, 2 mg, Oral, 4x Daily PRN, Hussein Mccurdy MD  •  magnesium hydroxide (MILK OF MAGNESIA) suspension 2400 mg/10mL 10 mL, 10 mL, Oral, Daily PRN, Janneth  MD Gracy  •  nicotine (NICODERM CQ) 21 MG/24HR patch 1 patch, 1 patch, Transdermal, Daily, Janneth Dubose MD, 1 patch at 11/17/17 0800  •  ondansetron (ZOFRAN) tablet 4 mg, 4 mg, Oral, Q6H PRN, Janneth Dubose MD  •  prazosin (MINIPRESS) capsule 3 mg, 3 mg, Oral, Nightly, Janneth Dubose MD, 3 mg at 11/16/17 2133  •  pregabalin (LYRICA) capsule 300 mg, 300 mg, Oral, BID, Janneth Dubose MD, 300 mg at 11/17/17 0800  •  sodium chloride (OCEAN) nasal spray 2 spray, 2 spray, Each Nare, PRN, Janneth Dubose MD  •  traZODone (DESYREL) tablet 100 mg, 100 mg, Oral, Nightly PRN, Hussein Mccurdy MD  •  trifluoperazine (STELAZINE) tablet 1 mg, 1 mg, Oral, BID PRN, Hussein Mccurdy MD    ASSESSMENT & PLAN:    Active Problems:    Major depressive disorder, recurrent, severe without psychosis    Plan: Continue Abilify and Lexapro at current doses.  Continue hospitalization for safety and stabilization.  Unfortunately, no beds at the crisis stabilization unit are available today.      Opioid dependence with withdrawal  Plan:  Continue supportive treatment of withdrawal.  I've added Imodium to help with her current GI complaints.      Post traumatic stress disorder (PTSD)  Plan: Continue Lexapro, Abilify and prazosin 3 mg nightly.  I've also increase trazodone to 100 mg nightly and added Stelazine 1 mg twice daily as needed for anxiety    Left periorbital cellulitis  Plan: Will apply warm compresses and bacitracin to the eyelid margin.    Suicide precautions: Suicide precaution Level 3 (q15 min checks)     Behavioral Health Treatment Plan and Problem List: I have reviewed and approved the Behavioral Health Treatment Plan and Problem list.  The patient has had a chance to review and agrees with the treatment plan.     Clinician:  Hussein Mccurdy MD  11/17/17  11:20 AM

## 2017-11-17 NOTE — PLAN OF CARE
Problem: BH Patient Care Overview (Adult)  Goal: Plan of Care Review  Outcome: Ongoing (interventions implemented as appropriate)    11/17/17 0693   Coping/Psychosocial Response Interventions   Plan Of Care Reviewed With patient   Coping/Psychosocial   Patient Agreement with Plan of Care agrees   Outcome Evaluation   Outcome Summary/Follow up Plan Pt has been very anxious at times today pacing the halls, cursing, and saying she needs something to help her. Pt is cooperative with staff. Pt denies SI/HI/BRITTANY but reports being paranoid. Continue to monitor.

## 2017-11-17 NOTE — PROGRESS NOTES
Therapist met with the Patient in her room, within view of staff, as covering therapist for Isabel, she was agreeable. The Patient reports that she is feeling poorly, and doesn't feel like meeting with the Therapist on this date. Therapist explained that a bed was still not available at [facility] on this date. The patient reports that she is feeling somewhat hungry and asked if she could get lunch. Therapist asked the staff if they would assist her and they were agreeable.

## 2017-11-17 NOTE — NURSING NOTE
Patient is requesting to be discharged today. Pt denies SI/HI/BRITTANY. Pt reports anxiety 2/10 and depression 4/10. Pt is alert and oriented x4.

## 2017-11-17 NOTE — PLAN OF CARE
Problem: BH Patient Care Overview (Adult)  Goal: Plan of Care Review  Outcome: Ongoing (interventions implemented as appropriate)    Problem: BH Overarching Goals  Goal: Adheres to Safety Considerations for Self and Others  Outcome: Ongoing (interventions implemented as appropriate)  Goal: Optimized Coping Skills in Response to Life Stressors  Outcome: Ongoing (interventions implemented as appropriate)  Goal: Develops/Participates in Therapeutic [place] to Support Successful Transition  Outcome: Ongoing (interventions implemented as appropriate)

## 2017-11-17 NOTE — PROGRESS NOTES
Navigator is helping Primary Therapist with discharge planning for patient. Navigator spoke with Aminata from [facility] on this day. Aminata states they have no female beds available at this time. Aminata instructed to call back Monday morning to check for availability is patient is still looking for placement.     [facility] - [phone]

## 2017-11-18 PROCEDURE — 25010000002 ZIPRASIDONE MESYLATE PER 10 MG: Performed by: PSYCHIATRY & NEUROLOGY

## 2017-11-18 PROCEDURE — 99233 SBSQ HOSP IP/OBS HIGH 50: CPT | Performed by: PSYCHIATRY & NEUROLOGY

## 2017-11-18 RX ORDER — CHLORPROMAZINE HYDROCHLORIDE 25 MG/1
25 TABLET, FILM COATED ORAL 3 TIMES DAILY PRN
Status: DISCONTINUED | OUTPATIENT
Start: 2017-11-18 | End: 2017-11-20 | Stop reason: HOSPADM

## 2017-11-18 RX ORDER — CHLORPROMAZINE HYDROCHLORIDE 25 MG/1
25 TABLET, FILM COATED ORAL 3 TIMES DAILY PRN
Status: DISCONTINUED | OUTPATIENT
Start: 2017-11-18 | End: 2017-11-18

## 2017-11-18 RX ORDER — ZIPRASIDONE MESYLATE 20 MG/ML
10 INJECTION, POWDER, LYOPHILIZED, FOR SOLUTION INTRAMUSCULAR ONCE
Status: COMPLETED | OUTPATIENT
Start: 2017-11-18 | End: 2017-11-18

## 2017-11-18 RX ADMIN — ARIPIPRAZOLE 5 MG: 10 TABLET ORAL at 08:04

## 2017-11-18 RX ADMIN — IBUPROFEN 600 MG: 600 TABLET ORAL at 02:11

## 2017-11-18 RX ADMIN — IBUPROFEN 600 MG: 600 TABLET ORAL at 15:25

## 2017-11-18 RX ADMIN — PREGABALIN 300 MG: 75 CAPSULE ORAL at 08:06

## 2017-11-18 RX ADMIN — NICOTINE 1 PATCH: 21 PATCH TRANSDERMAL at 08:05

## 2017-11-18 RX ADMIN — HYDROXYZINE HYDROCHLORIDE 50 MG: 50 TABLET ORAL at 08:50

## 2017-11-18 RX ADMIN — OLANZAPINE 5 MG: 5 TABLET, ORALLY DISINTEGRATING ORAL at 09:33

## 2017-11-18 RX ADMIN — PRAZOSIN HYDROCHLORIDE 3 MG: 1 CAPSULE ORAL at 20:39

## 2017-11-18 RX ADMIN — OLANZAPINE 5 MG: 5 TABLET, ORALLY DISINTEGRATING ORAL at 02:11

## 2017-11-18 RX ADMIN — HYDROXYZINE HYDROCHLORIDE 50 MG: 50 TABLET ORAL at 02:11

## 2017-11-18 RX ADMIN — IBUPROFEN 600 MG: 600 TABLET ORAL at 08:50

## 2017-11-18 RX ADMIN — TRIFLUOPERAZINE HYDROCHLORIDE 1 MG: 1 TABLET, FILM COATED ORAL at 07:11

## 2017-11-18 RX ADMIN — ZIPRASIDONE MESYLATE 10 MG: 20 INJECTION, POWDER, LYOPHILIZED, FOR SOLUTION INTRAMUSCULAR at 20:39

## 2017-11-18 RX ADMIN — PREGABALIN 300 MG: 75 CAPSULE ORAL at 17:06

## 2017-11-18 RX ADMIN — BACITRACIN ZINC 1 APPLICATION: 500 OINTMENT TOPICAL at 20:39

## 2017-11-18 RX ADMIN — CYCLOBENZAPRINE HYDROCHLORIDE 10 MG: 10 TABLET, FILM COATED ORAL at 09:34

## 2017-11-18 RX ADMIN — CHLORPROMAZINE HYDROCHLORIDE 25 MG: 25 TABLET, SUGAR COATED ORAL at 15:25

## 2017-11-18 RX ADMIN — ESCITALOPRAM OXALATE 20 MG: 10 TABLET, FILM COATED ORAL at 08:04

## 2017-11-18 NOTE — PLAN OF CARE
Problem: BH Patient Care Overview (Adult)  Goal: Plan of Care Review  Outcome: Ongoing (interventions implemented as appropriate)    Problem: BH Overarching Goals  Goal: Adheres to Safety Considerations for Self and Others  Outcome: Ongoing (interventions implemented as appropriate)  Goal: Optimized Coping Skills in Response to Life Stressors  Outcome: Ongoing (interventions implemented as appropriate)  Goal: Develops/Participates in Therapeutic [place] to Support Successful Transition  Outcome: Ongoing (interventions implemented as appropriate)

## 2017-11-18 NOTE — PROGRESS NOTES
"INPATIENT PSYCHIATRIC PROGRESS NOTE    Name:  Sary Mesa  :  1990  MRN:  [id number]  Visit Number:  [id number]  Length of stay:  3    SUBJECTIVE  CC: f/u depression    INTERVAL HISTORY:  The patient was seen for follow-up of depression.  She reports today she is feeling very anxious and per report she was very agitated and her behaviors were escalating last evening and needed a prn Geodon 10 mg IM and it helped her calm down. She reports that she was taking Suboxone in the past and last use was three weeks ago and denies any current withdrawals. She states that her left side of face is still swollen but reports that it is getting better.    Depression rating 6/10  Anxiety rating 9/10  Sleep: poor  Withdrawal sx: See review of systems below      Review of Systems   Constitutional: Negative.    HENT:        Complains of swelling, redness and pain just inferiorly into the left eye.   Respiratory: Negative.    Cardiovascular: Negative.    Gastrointestinal: Negative.    Musculoskeletal: Negative.    Neurological: Negative.        OBJECTIVE    Temp:  [97.3 °F (36.3 °C)] 97.3 °F (36.3 °C)  Heart Rate:  [114] 114  Resp:  [18] 18  BP: (112)/(74) 112/74      MENTAL STATUS EXAM:  Appearance: a bit disheveled.  Cooperation:Cooperative  Psychomotor: No psychomotor agitation/retardation, No EPS, No motor tics  Speech-normal rate, amount.  Mood \"anxious \"   Affect- constricted  Thought Content-goal directed, no delusional material present  Thought process-linear, organized.  Suicidality: No SI  Homicidality: No HI  Perception: No AH/VH  Insight- poor  Judgement-fair    Lab Results (last 24 hours)     ** No results found for the last 24 hours. **             Imaging Results (last 24 hours)     ** No results found for the last 24 hours. **             ECG/EMG Results (most recent)     Procedure Component Value Units Date/Time    ECG 12 Lead [[id number]] Collected:  11/15/17 3044     Updated:  17 1146    Narrative: "       Test Reason : Potential adverse reaction to medications.  Blood Pressure : **/** mmHG  Vent. Rate : 066 BPM     Atrial Rate : 066 BPM     P-R Int : 164 ms          QRS Dur : 090 ms      QT Int : 384 ms       P-R-T Axes : 020 046 044 degrees     QTc Int : 402 ms    Normal sinus rhythm  Normal ECG  When compared with ECG of 06-NOV-2017 22:55,  No significant change was found  Confirmed by Steffen Blount ([id number]) on 11/16/2017 11:46:35 AM    Referred By:  STEFFI           Confirmed By:Steffen Blount           ALLERGIES: Review of patient's allergies indicates no known allergies.      Current Facility-Administered Medications:   •  aluminum-magnesium hydroxide-simethicone (MAALOX MAX) 400-400-40 MG/5ML suspension 15 mL, 15 mL, Oral, Q6H PRN, Janneth Dubose MD  •  ARIPiprazole (ABILIFY) tablet 5 mg, 5 mg, Oral, Daily, Janneth Dubose MD, 5 mg at 11/18/17 0804  •  bacitracin ointment, , Topical, Q12H, Hussein Mccurdy MD  •  benzonatate (TESSALON) capsule 100 mg, 100 mg, Oral, TID PRN, Janneth Dubose MD  •  cyclobenzaprine (FLEXERIL) tablet 10 mg, 10 mg, Oral, Q8H PRN, Janneth Dubose MD, 10 mg at 11/18/17 0934  •  escitalopram (LEXAPRO) tablet 20 mg, 20 mg, Oral, Daily, Janneth Dubose MD, 20 mg at 11/18/17 0804  •  famotidine (PEPCID) tablet 20 mg, 20 mg, Oral, BID PRN, Janneth Dubose MD  •  hydrOXYzine (ATARAX) tablet 50 mg, 50 mg, Oral, Q6H PRN, Janneth Dubose MD, 50 mg at 11/18/17 0850  •  ibuprofen (ADVIL,MOTRIN) tablet 600 mg, 600 mg, Oral, Q6H PRN, Janneth Dubose MD, 600 mg at 11/18/17 0850  •  loperamide (IMODIUM) capsule 2 mg, 2 mg, Oral, 4x Daily PRN, Hussein Mccurdy MD  •  magnesium hydroxide (MILK OF MAGNESIA) suspension 2400 mg/10mL 10 mL, 10 mL, Oral, Daily PRN, Janneth Dubose MD  •  nicotine (NICODERM CQ) 21 MG/24HR patch 1 patch, 1 patch, Transdermal, Daily, Janneth Dubose MD, 1 patch at 11/18/17 0805  •  OLANZapine zydis (zyPREXA) disintegrating tablet 5 mg, 5 mg, Oral, 4x Daily PRN, Hussein  Kelsie Mccurdy MD, 5 mg at 11/18/17 0933  •  ondansetron (ZOFRAN) tablet 4 mg, 4 mg, Oral, Q6H PRN, Janneth Dubose MD  •  prazosin (MINIPRESS) capsule 3 mg, 3 mg, Oral, Nightly, Janneth Dubose MD, 3 mg at 11/16/17 2133  •  pregabalin (LYRICA) capsule 300 mg, 300 mg, Oral, BID, Janneth Dubose MD, 300 mg at 11/18/17 0806  •  sodium chloride (OCEAN) nasal spray 2 spray, 2 spray, Each Nare, PRN, Janneth Dubose MD  •  traZODone (DESYREL) tablet 100 mg, 100 mg, Oral, Nightly PRN, Hussein Mccurdy MD  •  trifluoperazine (STELAZINE) tablet 1 mg, 1 mg, Oral, BID PRN, Hussein Mccurdy MD, 1 mg at 11/18/17 0711    ASSESSMENT & PLAN:    Active Problems:    Major depressive disorder, recurrent, severe without psychosis  Plan: Continue Abilify and Lexapro at current doses.  Continue hospitalization for safety and stabilization.        Opioid dependence with withdrawal  Plan:  Continue supportive treatment of withdrawal.        Post traumatic stress disorder (PTSD)  Plan: Continue Lexapro, Abilify and prazosin. Pt reports Stelazine and Zyprexa are not helping. Start Thorazine 25 mg tid prn anxiety and dc stelazine and zyprexa.    Left periorbital cellulitis  Plan: Will apply warm compresses and bacitracin to the eyelid margin.    Suicide precautions: Suicide precaution Level 3 (q15 min checks)     Behavioral Health Treatment Plan and Problem List: I have reviewed and approved the Behavioral Health Treatment Plan and Problem list.  The patient has had a chance to review and agrees with the treatment plan.     Clinician:  Janneth Dubose MD  11/18/17  11:59 AM

## 2017-11-19 LAB
HAV IGM SERPL QL IA: ABNORMAL
HBV CORE IGM SERPL QL IA: ABNORMAL
HBV SURFACE AG SERPL QL IA: ABNORMAL
HCV AB SER DONR QL: REACTIVE
HIV1+2 AB SER QL: NORMAL

## 2017-11-19 PROCEDURE — 86631 CHLAMYDIA ANTIBODY: CPT | Performed by: PSYCHIATRY & NEUROLOGY

## 2017-11-19 PROCEDURE — G0432 EIA HIV-1/HIV-2 SCREEN: HCPCS | Performed by: PSYCHIATRY & NEUROLOGY

## 2017-11-19 PROCEDURE — 86741 NEISSERIA MENINGITIDIS: CPT | Performed by: PSYCHIATRY & NEUROLOGY

## 2017-11-19 PROCEDURE — 80074 ACUTE HEPATITIS PANEL: CPT | Performed by: PSYCHIATRY & NEUROLOGY

## 2017-11-19 PROCEDURE — 99232 SBSQ HOSP IP/OBS MODERATE 35: CPT | Performed by: PSYCHIATRY & NEUROLOGY

## 2017-11-19 RX ORDER — CHLORPROMAZINE HYDROCHLORIDE 25 MG/1
25 TABLET, FILM COATED ORAL ONCE
Status: COMPLETED | OUTPATIENT
Start: 2017-11-19 | End: 2017-11-19

## 2017-11-19 RX ORDER — AMOXICILLIN 500 MG/1
500 CAPSULE ORAL EVERY 8 HOURS SCHEDULED
Status: DISCONTINUED | OUTPATIENT
Start: 2017-11-19 | End: 2017-11-20 | Stop reason: HOSPADM

## 2017-11-19 RX ADMIN — IBUPROFEN 600 MG: 600 TABLET ORAL at 19:23

## 2017-11-19 RX ADMIN — PREGABALIN 300 MG: 75 CAPSULE ORAL at 17:01

## 2017-11-19 RX ADMIN — CHLORPROMAZINE HYDROCHLORIDE 25 MG: 25 TABLET, SUGAR COATED ORAL at 17:01

## 2017-11-19 RX ADMIN — PREGABALIN 300 MG: 75 CAPSULE ORAL at 08:09

## 2017-11-19 RX ADMIN — CYCLOBENZAPRINE HYDROCHLORIDE 10 MG: 10 TABLET, FILM COATED ORAL at 19:23

## 2017-11-19 RX ADMIN — TRAZODONE HYDROCHLORIDE 100 MG: 50 TABLET ORAL at 00:11

## 2017-11-19 RX ADMIN — MAGNESIUM HYDROXIDE 10 ML: 2400 SUSPENSION ORAL at 06:52

## 2017-11-19 RX ADMIN — CHLORPROMAZINE HYDROCHLORIDE 25 MG: 25 TABLET, SUGAR COATED ORAL at 13:59

## 2017-11-19 RX ADMIN — BACITRACIN ZINC 1 APPLICATION: 500 OINTMENT TOPICAL at 21:14

## 2017-11-19 RX ADMIN — AMOXICILLIN 500 MG: 500 CAPSULE ORAL at 14:44

## 2017-11-19 RX ADMIN — ESCITALOPRAM OXALATE 20 MG: 10 TABLET, FILM COATED ORAL at 08:08

## 2017-11-19 RX ADMIN — IBUPROFEN 600 MG: 600 TABLET ORAL at 06:51

## 2017-11-19 RX ADMIN — LIDOCAINE HYDROCHLORIDE 5 ML: 20 SOLUTION ORAL; TOPICAL at 20:03

## 2017-11-19 RX ADMIN — AMOXICILLIN 500 MG: 500 CAPSULE ORAL at 21:14

## 2017-11-19 RX ADMIN — IBUPROFEN 600 MG: 600 TABLET ORAL at 13:59

## 2017-11-19 RX ADMIN — NICOTINE 1 PATCH: 21 PATCH TRANSDERMAL at 08:09

## 2017-11-19 RX ADMIN — HYDROXYZINE HYDROCHLORIDE 50 MG: 50 TABLET ORAL at 16:43

## 2017-11-19 RX ADMIN — TRAZODONE HYDROCHLORIDE 100 MG: 50 TABLET ORAL at 20:03

## 2017-11-19 RX ADMIN — CHLORPROMAZINE HYDROCHLORIDE 25 MG: 25 TABLET, SUGAR COATED ORAL at 08:10

## 2017-11-19 RX ADMIN — ARIPIPRAZOLE 5 MG: 10 TABLET ORAL at 08:08

## 2017-11-19 RX ADMIN — PRAZOSIN HYDROCHLORIDE 3 MG: 1 CAPSULE ORAL at 20:03

## 2017-11-19 NOTE — NURSING NOTE
Patient has been fine on unit all day except when Dr Dubose on unit patient tried to act out being loud and cursing trying to get extra medication. Patient also acts out each evening near shift change seeking medication. Pt was educated by staff on other alternatives to medication.

## 2017-11-19 NOTE — PLAN OF CARE
Problem:  Patient Care Overview (Adult)  Goal: Plan of Care Review  Outcome: Ongoing (interventions implemented as appropriate)    11/19/17 4952   Coping/Psychosocial Response Interventions   Plan Of Care Reviewed With patient   Coping/Psychosocial   Patient Agreement with Plan of Care agrees   Patient Care Overview   Progress no change   Outcome Evaluation   Outcome Summary/Follow up Plan Pt cooperative with staff but is seeking at times. Pt rated anxiety 1/10 earlier but asked for meds for anxiety multiple times. Pt denies depression SI/HI/BRITTANY. Pt wants to DC tomorrow. Continue to monitor.

## 2017-11-19 NOTE — NURSING NOTE
"Patient came to nursing desk at this time cursing at staff members behind counter stating that all of us accused her of stealing items from the bed next to hers. Attempted to cam patient down. Emotional support provided to patient at this time. Patient became very aggressive towards staff. Patient continues to curse at staff members and noted she tends to seek medications at this time daily. Patient screamed out \"Well, I guess somebody is gonna call the doctor and get me another shot because my nerves are tore up!\" The other nurse on unit advised patient to please calm down. Will continue to monitor.  "

## 2017-11-19 NOTE — PROGRESS NOTES
"INPATIENT PSYCHIATRIC PROGRESS NOTE    Name:  Sary Mesa  :  1990  MRN:  [id number]  Visit Number:  [id number]  Length of stay:  4    SUBJECTIVE  CC: f/u depression    INTERVAL HISTORY:  The patient states that she is feeling better and thorazine is helping. She states that her left face swelling is getting worse and it is hurting. She states that her left upper gum is also hurting. She agreed to start Amoxicillin. She is also concerned about having contracted sexually transmitted disease as she thinks her bf was cheating on her. She wants to be tested.    Depression rating 1/10  Anxiety rating 4/10  Sleep: poor  Withdrawal sx: bodyaches, feeling hot and cold, diarrhea      Review of Systems   Constitutional: Negative.    HENT:        Complains of swelling, redness and pain just inferiorly into the left eye.   Respiratory: Negative.    Cardiovascular: Negative.    Gastrointestinal: Positive for diarrhea.   Musculoskeletal: Negative.    Neurological: Negative.        OBJECTIVE    Temp:  [97.5 °F (36.4 °C)-98 °F (36.7 °C)] 98 °F (36.7 °C)  Heart Rate:  [102-125] 125  Resp:  [18] 18  BP: (119-131)/(70-83) 131/83      MENTAL STATUS EXAM:  Appearance: a bit disheveled.  Cooperation:Cooperative  Psychomotor: No psychomotor agitation/retardation, No EPS, No motor tics  Speech-normal rate, amount.  Mood \"anxious \"   Affect- constricted  Thought Content-goal directed, no delusional material present  Thought process-linear, organized.  Suicidality: No SI  Homicidality: No HI  Perception: No AH/VH  Insight- poor  Judgement-fair    Lab Results (last 24 hours)     ** No results found for the last 24 hours. **             Imaging Results (last 24 hours)     ** No results found for the last 24 hours. **             ECG/EMG Results (most recent)     Procedure Component Value Units Date/Time    ECG 12 Lead [[id number]] Collected:  11/15/17 1734     Updated:  17 1146    Narrative:       Test Reason : Potential " adverse reaction to medications.  Blood Pressure : **/** mmHG  Vent. Rate : 066 BPM     Atrial Rate : 066 BPM     P-R Int : 164 ms          QRS Dur : 090 ms      QT Int : 384 ms       P-R-T Axes : 020 046 044 degrees     QTc Int : 402 ms    Normal sinus rhythm  Normal ECG  When compared with ECG of 06-NOV-2017 22:55,  No significant change was found  Confirmed by Steffen Blount ([id number]) on 11/16/2017 11:46:35 AM    Referred By:  STEFFI           Confirmed By:Steffen Blount           ALLERGIES: Review of patient's allergies indicates no known allergies.      Current Facility-Administered Medications:   •  aluminum-magnesium hydroxide-simethicone (MAALOX MAX) 400-400-40 MG/5ML suspension 15 mL, 15 mL, Oral, Q6H PRN, Janneth Dubose MD  •  ARIPiprazole (ABILIFY) tablet 5 mg, 5 mg, Oral, Daily, Janneth Dubose MD, 5 mg at 11/19/17 0808  •  bacitracin ointment, , Topical, Q12H, Hussein Mccurdy MD, 1 application at 11/18/17 2039  •  benzonatate (TESSALON) capsule 100 mg, 100 mg, Oral, TID PRN, Janneth Dubose MD  •  chlorproMAZINE (THORAZINE) tablet 25 mg, 25 mg, Oral, TID PRN, Janneth Dubose MD, 25 mg at 11/19/17 0810  •  cyclobenzaprine (FLEXERIL) tablet 10 mg, 10 mg, Oral, Q8H PRN, Janneth Dubose MD, 10 mg at 11/18/17 0934  •  escitalopram (LEXAPRO) tablet 20 mg, 20 mg, Oral, Daily, Janneth Dubose MD, 20 mg at 11/19/17 0808  •  famotidine (PEPCID) tablet 20 mg, 20 mg, Oral, BID PRN, Janneth Dubose MD  •  hydrOXYzine (ATARAX) tablet 50 mg, 50 mg, Oral, Q6H PRN, Janneth Dubose MD, 50 mg at 11/18/17 0850  •  ibuprofen (ADVIL,MOTRIN) tablet 600 mg, 600 mg, Oral, Q6H PRN, Janneth Dubose MD, 600 mg at 11/19/17 0651  •  loperamide (IMODIUM) capsule 2 mg, 2 mg, Oral, 4x Daily PRN, Hussein Mccurdy MD  •  magnesium hydroxide (MILK OF MAGNESIA) suspension 2400 mg/10mL 10 mL, 10 mL, Oral, Daily PRN, Janneth Dubose MD, 10 mL at 11/19/17 0652  •  nicotine (NICODERM CQ) 21 MG/24HR patch 1 patch, 1 patch, Transdermal, Daily, Janneth  MD Gracy, 1 patch at 11/19/17 0809  •  ondansetron (ZOFRAN) tablet 4 mg, 4 mg, Oral, Q6H PRN, Janneth Dubose MD  •  prazosin (MINIPRESS) capsule 3 mg, 3 mg, Oral, Nightly, Janneth Dubose MD, 3 mg at 11/18/17 2039  •  pregabalin (LYRICA) capsule 300 mg, 300 mg, Oral, BID, Janneth Dubose MD, 300 mg at 11/19/17 0809  •  sodium chloride (OCEAN) nasal spray 2 spray, 2 spray, Each Nare, PRN, Janneth Dubose MD  •  traZODone (DESYREL) tablet 100 mg, 100 mg, Oral, Nightly PRN, Hussein Mccurdy MD, 100 mg at 11/19/17 0011    ASSESSMENT & PLAN:    Active Problems:    Major depressive disorder, recurrent, severe without psychosis  Plan: Continue Abilify and Lexapro at current doses.  Continue hospitalization for safety and stabilization.        Opioid dependence with withdrawal  Plan:  Continue supportive treatment of withdrawal.        Post traumatic stress disorder (PTSD)  Plan: Continue Lexapro, Abilify and prazosin. Thorazine 25 mg tid prn anxiety.      Left periorbital cellulitis  Plan: Amoxicillin 500 mg q 8 hr for 7 days.    Suicide precautions: Suicide precaution Level 3 (q15 min checks)     Behavioral Health Treatment Plan and Problem List: I have reviewed and approved the Behavioral Health Treatment Plan and Problem list.  The patient has had a chance to review and agrees with the treatment plan.     Clinician:  Janneth Dubose MD  11/19/17  1:23 PM

## 2017-11-19 NOTE — PLAN OF CARE
Problem:  Patient Care Overview (Adult)  Goal: Plan of Care Review  Outcome: Ongoing (interventions implemented as appropriate)    11/19/17 0349   Coping/Psychosocial Response Interventions   Plan Of Care Reviewed With patient   Coping/Psychosocial   Patient Agreement with Plan of Care agrees   Patient Care Overview   Progress improving   Outcome Evaluation   Outcome Summary/Follow up Plan Pt. isolates in her room most of the shift. Reports appetite and sleep are good. Rates anxiety and depression 4/10. Pt. came out around midnight requesting sleep medication. No problems noted,. Will continue to monitor.          Problem:  Overarching Goals  Goal: Adheres to Safety Considerations for Self and Others  Outcome: Ongoing (interventions implemented as appropriate)  Goal: Optimized Coping Skills in Response to Life Stressors  Outcome: Ongoing (interventions implemented as appropriate)  Goal: Develops/Participates in Therapeutic [place] to Support Successful Transition  Outcome: Ongoing (interventions implemented as appropriate)

## 2017-11-19 NOTE — NURSING NOTE
Called to patient room by another staff member due to blanket and pillow being removed from bed. Patient in room was asked did she removed a blanket and pillow. We could not find it and it was placed their just a moment before. Patient was educated and encouraged to ask staff for additional blankets and pillows when needed. Staff placed new pillows and new blankets back on bed A. Patient was laughing and joking and stated she would do that.  Will continue to monitor.

## 2017-11-20 VITALS
SYSTOLIC BLOOD PRESSURE: 132 MMHG | BODY MASS INDEX: 33.9 KG/M2 | HEART RATE: 109 BPM | HEIGHT: 67 IN | DIASTOLIC BLOOD PRESSURE: 88 MMHG | TEMPERATURE: 98 F | WEIGHT: 216 LBS | OXYGEN SATURATION: 97 % | RESPIRATION RATE: 18 BRPM

## 2017-11-20 PROCEDURE — 99239 HOSP IP/OBS DSCHRG MGMT >30: CPT | Performed by: PSYCHIATRY & NEUROLOGY

## 2017-11-20 RX ORDER — ARIPIPRAZOLE 10 MG/1
10 TABLET ORAL DAILY
Status: DISCONTINUED | OUTPATIENT
Start: 2017-11-21 | End: 2017-11-20 | Stop reason: HOSPADM

## 2017-11-20 RX ORDER — AMOXICILLIN 500 MG/1
500 CAPSULE ORAL EVERY 8 HOURS SCHEDULED
Qty: 18 CAPSULE | Refills: 0 | Status: SHIPPED | OUTPATIENT
Start: 2017-11-20 | End: 2017-11-26

## 2017-11-20 RX ORDER — ARIPIPRAZOLE 10 MG/1
10 TABLET ORAL DAILY
Qty: 30 TABLET | Refills: 0 | Status: SHIPPED | OUTPATIENT
Start: 2017-11-21

## 2017-11-20 RX ORDER — PRAZOSIN HYDROCHLORIDE 1 MG/1
3 CAPSULE ORAL NIGHTLY
Qty: 90 CAPSULE | Refills: 0 | Status: SHIPPED | OUTPATIENT
Start: 2017-11-20

## 2017-11-20 RX ORDER — HYDROXYZINE 50 MG/1
50 TABLET, FILM COATED ORAL EVERY 6 HOURS PRN
Qty: 90 TABLET | Refills: 0 | Status: SHIPPED | OUTPATIENT
Start: 2017-11-20

## 2017-11-20 RX ADMIN — IBUPROFEN 600 MG: 600 TABLET ORAL at 01:41

## 2017-11-20 RX ADMIN — ESCITALOPRAM OXALATE 20 MG: 10 TABLET, FILM COATED ORAL at 08:05

## 2017-11-20 RX ADMIN — AMOXICILLIN 500 MG: 500 CAPSULE ORAL at 06:21

## 2017-11-20 RX ADMIN — CYCLOBENZAPRINE HYDROCHLORIDE 10 MG: 10 TABLET, FILM COATED ORAL at 11:49

## 2017-11-20 RX ADMIN — BACITRACIN ZINC: 500 OINTMENT TOPICAL at 08:07

## 2017-11-20 RX ADMIN — NICOTINE 1 PATCH: 21 PATCH TRANSDERMAL at 08:05

## 2017-11-20 RX ADMIN — CHLORPROMAZINE HYDROCHLORIDE 25 MG: 25 TABLET, SUGAR COATED ORAL at 02:23

## 2017-11-20 RX ADMIN — CYCLOBENZAPRINE HYDROCHLORIDE 10 MG: 10 TABLET, FILM COATED ORAL at 03:21

## 2017-11-20 RX ADMIN — AMOXICILLIN 500 MG: 500 CAPSULE ORAL at 13:43

## 2017-11-20 RX ADMIN — IBUPROFEN 600 MG: 600 TABLET ORAL at 08:05

## 2017-11-20 RX ADMIN — ARIPIPRAZOLE 5 MG: 10 TABLET ORAL at 08:05

## 2017-11-20 RX ADMIN — PREGABALIN 300 MG: 75 CAPSULE ORAL at 08:05

## 2017-11-20 RX ADMIN — HYDROXYZINE HYDROCHLORIDE 50 MG: 50 TABLET ORAL at 01:41

## 2017-11-20 RX ADMIN — HYDROXYZINE HYDROCHLORIDE 50 MG: 50 TABLET ORAL at 08:05

## 2017-11-20 NOTE — PROGRESS NOTES
Contacted the [facility] this morning and was informed that there are no female beds available today due to no discharges but should have beds tomorrow morning.

## 2017-11-20 NOTE — PROGRESS NOTES
0910  Therapist contacted [facility] this date and spoke to Hillary. Therapist was informed that Patient has been approved for a bed there tomorrow, November 21, 2017 at 10am.     [facility] will provide transportation with an estimated time of 10am.

## 2017-11-20 NOTE — NURSING NOTE
"Pt continues to argue and curse at staff. Cristina, Lead RN on unit. Staff attempting to redirect patient. Pt continues to argue and yell over staff. Explained to patient that  If she continued this behavior that she would be placed in seclusion. Pt screamed \"I don't care, I'm sorry you don't want to do your job.\" Asked patient to walk to seclusion. Patient stated \"No,  I don't want to go in there. I'm trying to calm down, it's just hard for me.\" Allowed patient to sit in dayroom with staff at side to calm down. Emotional support provided. Patient then walked to her room with staff at side. Will continue to monitor.   "

## 2017-11-20 NOTE — DISCHARGE SUMMARY
PSYCHIATRIC DISCHARGE SUMMARY     Patient Identification:  Name:  Sary Mesa  Age:  27 y.o.  Sex:  female  :  1990  MRN:  [id number]  Visit Number:  [id number]      Date of Admission:11/15/2017   Date of Discharge:  2017    Discharge Diagnosis:  Principal Problem:    Severe episode of recurrent major depressive disorder, without psychotic features  Active Problems:  Opioid dependence with withdrawal  Post traumatic stress disorder (PTSD)  Borderline personality disorder      Admission Diagnosis:  Depression with suicidal ideation [F32.9, R45.851]     Hospital Course  Patient is a 27 y.o. female presented with worsening depression  and suicidal thoughts.  She originally was referred to the [facility] since she was not having any intent on acting on these thoughts and she was recently discharged from the hospital; however because was no bed available she was admitted here.  On the unit, the patient was initially irritable and going through opioid withdrawal.  The patient was hyper focused on obtaining medication for anxiety.  We discussed that the Lexapro had recently been started and we would continue at 20 mg daily.  She was continued on Abilify 5 mg daily and prazosin was increased to 3 mg nightly for nightmares associated with PTSD.  During the hospitalization, the patient was initially quite irritable and throughout the hospitalization she was seeking medications.  She was started on Stelazine as needed but did not find this beneficial and then started on Thorazine.  She had a conflict with the nursing staff after they accused her of trying to steal a blanket.  The patient was agitated and demanded as needed injections for anxiety.  She was put in seclusion and because had made statements about wanting to harm herself was also put on a one-to-one sitter.  The patient did not actually engage in any self-harm behaviors.  On day of discharge, the patient appeared to be somewhat euphoric  "but mainly was requesting discharge.  At this time, she minimized the difficult weekend she had and was quite ingratiating toward me.  It was felt that this was manipulative behavior and a part of her personality structure.  While I encouraged her to stay 1 more day while I increased the Abilify, she was insistent on leaving and willing to follow up with comprehensive care.  We also plan for her to go on to the [facility] however she felt safe returning home to her mother.  It was not felt that the patient met criteria for a 72 hour hold and because of her willingness to comply with treatment recommendations, she was discharged as a regular discharge.    Additionally, the patient was noted to have some mild inferior periorbital swelling and edema.  Initially we tried to treat this with warm compresses however over the weekend she was started on amoxicillin and noted some improvement in symptoms.    Mental Status Exam upon discharge:   Mood \"good \"   Affect- slightly euphoric  Thought Content-goal directed, no delusional material present  Thought process-linear, organized.  Suicidality: No SI  Homicidality: No HI  Perception: No AH/VH    Procedures Performed         Consults:   Consults     No orders found from 10/17/2017 to 11/16/2017.          Pertinent Test Results:   UDS was positive for buprenorphine and THC, the CBC, UA, and CMP were unremarkable.  HIV was negative, hepatitis C antibody was reactive.  GC and Chlamydia were pending at time of discharge    Condition on Discharge:  guarded    Vital Signs  Temp:  [98 °F (36.7 °C)] 98 °F (36.7 °C)  Heart Rate:  [100-109] 109  Resp:  [18] 18  BP: (132-140)/(88-98) 132/88      Discharge Disposition:  Home or Self Care    Discharge Medications:   Sary Mesa   Home Medication Instructions KELECHI:[id number]    Printed on:11/20/17 1203   Medication Information                      amoxicillin (AMOXIL) 500 MG capsule  Take 1 capsule by mouth Every 8 (Eight) Hours for " 18 doses. Indications: Skin and Soft Tissue Infection             ARIPiprazole (ABILIFY) 10 MG tablet  Take 1 tablet by mouth Daily.             cyclobenzaprine (FLEXERIL) 10 MG tablet  Take 10 mg by mouth Every 8 (Eight) Hours As Needed for Muscle Spasms.             escitalopram (LEXAPRO) 20 MG tablet  Take 1 tablet by mouth Daily.             hydrOXYzine (ATARAX) 50 MG tablet  Take 1 tablet by mouth Every 6 (Six) Hours As Needed for Anxiety.             ibuprofen (ADVIL,MOTRIN) 800 MG tablet  Take 800 mg by mouth Every 8 (Eight) Hours As Needed for Mild Pain .             nabumetone (RELAFEN) 500 MG tablet  Take 500 mg by mouth 2 (Two) Times a Day As Needed for Mild Pain .             prazosin (MINIPRESS) 1 MG capsule  Take 3 capsules by mouth Every Night.             pregabalin (LYRICA) 300 MG capsule  Take 300 mg by mouth 2 (Two) Times a Day.                 Discharge Diet: regular    Activity at Discharge: as tolerated    Follow-up Appointments  [facility]    Test Results Pending at Discharge   Order Current Status    Chlamydia Antibodies In process    Neisseria Gonorrhoea Antibodies In process          Clinician:   Hussein Mccurdy MD  11/20/17  12:44 PM    I spent over 30 mins evaluating and discharging this patient.

## 2017-11-20 NOTE — PLAN OF CARE
"Problem: [facility] Patient Care Overview (Adult)  Goal: Plan of Care Review  Outcome: Ongoing (interventions implemented as appropriate)    11/20/17 0312   Coping/Psychosocial Response Interventions   Plan Of Care Reviewed With patient   Coping/Psychosocial   Patient Agreement with Plan of Care agrees   Patient Care Overview   Progress no change   Outcome Evaluation   Outcome Summary/Follow up Plan Pt is preoccupied with medications and seeks them often. Pt asked for \"a shot\" multiple times this shift. Pt tearful at beginning of shift but later noted to be talking and laughing with peer.            "

## 2017-11-20 NOTE — NURSING NOTE
"Pt came up to nurses station and stated \"I'm anxious and my mind is racing and I cant sleep. Is there not something else you can give me?\" SONIYA Dahl told patient she would check to see. Hesham informed patient that she has had all the medication she was ordered at the present time. Patient had asked for and received ordered medication frequently throughout the shift, see MAR for details. Pt then stated, \"Can you not call a doctor and see if they will order me a shot like they did before to help me sleep.\" Explained to patient that injection medications are not indicated for sleep. Hesham explained to patient that she may need to give the po medication she was just given previously a little more time to work. Attempted to redirect patient and provide emotional support. Listed for patient all the medications she has received this shift as well as offered her diversion activities to do. Pt then began to raise voice and stated \"I don't know why you all wont help me, what am I supposed to do. I'm to the point of hurting myself, that's how bad I feel.\" Dr. Augustin informed. New orders for SP1 received.     "

## 2017-11-20 NOTE — DISCHARGE INSTR - APPOINTMENTS
Please follow up with:  [facility]                                               [address] [address]                                       Phone: [phone]    You have an appointment on Wednesday November the 29th at 10:00 am, this is the earliest available appointment due to the holiday.

## 2017-11-22 LAB — CHLAMYDIA IGG SER-ACNC: <0.91 RATIO (ref 0–0.9)

## 2017-11-23 LAB — Lab: NORMAL

## 2025-02-13 NOTE — PLAN OF CARE
"Problem:  Patient Care Overview (Adult)  Goal: Plan of Care Review  Outcome: Ongoing (interventions implemented as appropriate)    11/07/17 0624   Coping/Psychosocial Response Interventions   Plan Of Care Reviewed With patient   Coping/Psychosocial   Patient Agreement with Plan of Care agrees   Patient Care Overview   Progress no change   Outcome Evaluation   Outcome Summary/Follow up Plan Pt. is stable and slept all night. States she has SI but no plan and will tell staff if she feels overwhelmed. She denies hallucinations. Labs were positive for Meth, THC, Suboxone. In the ER she stated she heard voices on the tv and radio \"talking about me.\" History of Bipolar, MDD, PTSD. MD ordered Clonidine PRN. Patient's mother called the ER and stated \"she's acting crazy, don't let her out.\"           " No